# Patient Record
Sex: FEMALE | Race: WHITE | NOT HISPANIC OR LATINO | Employment: OTHER | URBAN - METROPOLITAN AREA
[De-identification: names, ages, dates, MRNs, and addresses within clinical notes are randomized per-mention and may not be internally consistent; named-entity substitution may affect disease eponyms.]

---

## 2020-08-16 ENCOUNTER — HOSPITAL ENCOUNTER (INPATIENT)
Facility: HOSPITAL | Age: 75
LOS: 4 days | Discharge: HOME HEALTH | End: 2020-08-20
Attending: INTERNAL MEDICINE | Admitting: INTERNAL MEDICINE

## 2020-08-16 ENCOUNTER — APPOINTMENT (OUTPATIENT)
Dept: CT IMAGING | Facility: HOSPITAL | Age: 75
End: 2020-08-16

## 2020-08-16 ENCOUNTER — APPOINTMENT (OUTPATIENT)
Dept: GENERAL RADIOLOGY | Facility: HOSPITAL | Age: 75
End: 2020-08-16

## 2020-08-16 DIAGNOSIS — R47.1 DYSARTHRIA: Primary | ICD-10-CM

## 2020-08-16 DIAGNOSIS — R13.12 OROPHARYNGEAL DYSPHAGIA: ICD-10-CM

## 2020-08-16 DIAGNOSIS — R41.841 COGNITIVE COMMUNICATION DEFICIT: ICD-10-CM

## 2020-08-16 DIAGNOSIS — I63.9 CEREBROVASCULAR ACCIDENT (CVA), UNSPECIFIED MECHANISM (HCC): ICD-10-CM

## 2020-08-16 LAB
ALBUMIN SERPL-MCNC: 3.8 G/DL (ref 3.5–5.2)
ALBUMIN/GLOB SERPL: 1.3 G/DL
ALP SERPL-CCNC: 236 U/L (ref 39–117)
ALT SERPL W P-5'-P-CCNC: 7 U/L (ref 1–33)
ANION GAP SERPL CALCULATED.3IONS-SCNC: 13 MMOL/L (ref 5–15)
AST SERPL-CCNC: 14 U/L (ref 1–32)
BASOPHILS # BLD AUTO: 0.06 10*3/MM3 (ref 0–0.2)
BASOPHILS NFR BLD AUTO: 0.5 % (ref 0–1.5)
BILIRUB SERPL-MCNC: 0.4 MG/DL (ref 0–1.2)
BUN SERPL-MCNC: 18 MG/DL (ref 8–23)
BUN/CREAT SERPL: 31 (ref 7–25)
CALCIUM SPEC-SCNC: 9.8 MG/DL (ref 8.6–10.5)
CHLORIDE SERPL-SCNC: 104 MMOL/L (ref 98–107)
CO2 SERPL-SCNC: 21 MMOL/L (ref 22–29)
CREAT SERPL-MCNC: 0.58 MG/DL (ref 0.57–1)
DEPRECATED RDW RBC AUTO: 60.2 FL (ref 37–54)
EOSINOPHIL # BLD AUTO: 0.04 10*3/MM3 (ref 0–0.4)
EOSINOPHIL NFR BLD AUTO: 0.3 % (ref 0.3–6.2)
ERYTHROCYTE [DISTWIDTH] IN BLOOD BY AUTOMATED COUNT: 17 % (ref 12.3–15.4)
GFR SERPL CREATININE-BSD FRML MDRD: 101 ML/MIN/1.73
GLOBULIN UR ELPH-MCNC: 2.9 GM/DL
GLUCOSE BLDC GLUCOMTR-MCNC: 114 MG/DL (ref 70–130)
GLUCOSE SERPL-MCNC: 106 MG/DL (ref 65–99)
HCT VFR BLD AUTO: 36.9 % (ref 34–46.6)
HGB BLD-MCNC: 11.8 G/DL (ref 12–15.9)
IMM GRANULOCYTES # BLD AUTO: 0.06 10*3/MM3 (ref 0–0.05)
IMM GRANULOCYTES NFR BLD AUTO: 0.5 % (ref 0–0.5)
INR PPP: 0.99 (ref 0.85–1.16)
LYMPHOCYTES # BLD AUTO: 1.54 10*3/MM3 (ref 0.7–3.1)
LYMPHOCYTES NFR BLD AUTO: 11.7 % (ref 19.6–45.3)
MAGNESIUM SERPL-MCNC: 1.9 MG/DL (ref 1.6–2.4)
MCH RBC QN AUTO: 31 PG (ref 26.6–33)
MCHC RBC AUTO-ENTMCNC: 32 G/DL (ref 31.5–35.7)
MCV RBC AUTO: 96.9 FL (ref 79–97)
MONOCYTES # BLD AUTO: 1.11 10*3/MM3 (ref 0.1–0.9)
MONOCYTES NFR BLD AUTO: 8.5 % (ref 5–12)
NEUTROPHILS NFR BLD AUTO: 10.31 10*3/MM3 (ref 1.7–7)
NEUTROPHILS NFR BLD AUTO: 78.5 % (ref 42.7–76)
NRBC BLD AUTO-RTO: 0 /100 WBC (ref 0–0.2)
NT-PROBNP SERPL-MCNC: 2005 PG/ML (ref 0–1800)
PLATELET # BLD AUTO: 459 10*3/MM3 (ref 140–450)
PMV BLD AUTO: 8.9 FL (ref 6–12)
POTASSIUM SERPL-SCNC: 3.8 MMOL/L (ref 3.5–5.2)
PROCALCITONIN SERPL-MCNC: 3.24 NG/ML (ref 0–0.25)
PROT SERPL-MCNC: 6.7 G/DL (ref 6–8.5)
PROTHROMBIN TIME: 12.8 SECONDS (ref 11.5–14)
RBC # BLD AUTO: 3.81 10*6/MM3 (ref 3.77–5.28)
SODIUM SERPL-SCNC: 138 MMOL/L (ref 136–145)
TROPONIN T SERPL-MCNC: <0.01 NG/ML (ref 0–0.03)
WBC # BLD AUTO: 13.12 10*3/MM3 (ref 3.4–10.8)

## 2020-08-16 PROCEDURE — 80053 COMPREHEN METABOLIC PANEL: CPT | Performed by: INTERNAL MEDICINE

## 2020-08-16 PROCEDURE — 84484 ASSAY OF TROPONIN QUANT: CPT | Performed by: INTERNAL MEDICINE

## 2020-08-16 PROCEDURE — 93005 ELECTROCARDIOGRAM TRACING: CPT | Performed by: INTERNAL MEDICINE

## 2020-08-16 PROCEDURE — 70498 CT ANGIOGRAPHY NECK: CPT

## 2020-08-16 PROCEDURE — 83880 ASSAY OF NATRIURETIC PEPTIDE: CPT | Performed by: INTERNAL MEDICINE

## 2020-08-16 PROCEDURE — 85652 RBC SED RATE AUTOMATED: CPT | Performed by: INTERNAL MEDICINE

## 2020-08-16 PROCEDURE — 0042T HC CT CEREBRAL PERFUSION W/WO CONTRAST: CPT

## 2020-08-16 PROCEDURE — 84145 PROCALCITONIN (PCT): CPT | Performed by: INTERNAL MEDICINE

## 2020-08-16 PROCEDURE — 85025 COMPLETE CBC W/AUTO DIFF WBC: CPT | Performed by: INTERNAL MEDICINE

## 2020-08-16 PROCEDURE — 99222 1ST HOSP IP/OBS MODERATE 55: CPT | Performed by: NURSE PRACTITIONER

## 2020-08-16 PROCEDURE — 25010000002 ONDANSETRON PER 1 MG: Performed by: INTERNAL MEDICINE

## 2020-08-16 PROCEDURE — 99223 1ST HOSP IP/OBS HIGH 75: CPT | Performed by: INTERNAL MEDICINE

## 2020-08-16 PROCEDURE — 71045 X-RAY EXAM CHEST 1 VIEW: CPT

## 2020-08-16 PROCEDURE — 70496 CT ANGIOGRAPHY HEAD: CPT

## 2020-08-16 PROCEDURE — 0 IOPAMIDOL PER 1 ML: Performed by: INTERNAL MEDICINE

## 2020-08-16 PROCEDURE — 83735 ASSAY OF MAGNESIUM: CPT | Performed by: INTERNAL MEDICINE

## 2020-08-16 PROCEDURE — 82962 GLUCOSE BLOOD TEST: CPT

## 2020-08-16 PROCEDURE — 93010 ELECTROCARDIOGRAM REPORT: CPT | Performed by: INTERNAL MEDICINE

## 2020-08-16 PROCEDURE — 85610 PROTHROMBIN TIME: CPT | Performed by: INTERNAL MEDICINE

## 2020-08-16 RX ORDER — ASPIRIN 81 MG/1
81 TABLET, CHEWABLE ORAL DAILY
Status: DISCONTINUED | OUTPATIENT
Start: 2020-08-17 | End: 2020-08-20 | Stop reason: HOSPADM

## 2020-08-16 RX ORDER — ACETAMINOPHEN 325 MG/1
650 TABLET ORAL EVERY 4 HOURS PRN
Status: DISCONTINUED | OUTPATIENT
Start: 2020-08-16 | End: 2020-08-20 | Stop reason: HOSPADM

## 2020-08-16 RX ORDER — MORPHINE SULFATE 2 MG/ML
0.5 INJECTION, SOLUTION INTRAMUSCULAR; INTRAVENOUS ONCE
Status: COMPLETED | OUTPATIENT
Start: 2020-08-17 | End: 2020-08-16

## 2020-08-16 RX ORDER — HYDRALAZINE HYDROCHLORIDE 25 MG/1
25 TABLET, FILM COATED ORAL 3 TIMES DAILY
COMMUNITY

## 2020-08-16 RX ORDER — ONDANSETRON 4 MG/1
4 TABLET, FILM COATED ORAL EVERY 6 HOURS PRN
Status: DISCONTINUED | OUTPATIENT
Start: 2020-08-16 | End: 2020-08-20 | Stop reason: HOSPADM

## 2020-08-16 RX ORDER — CLOPIDOGREL BISULFATE 75 MG/1
75 TABLET ORAL DAILY
Status: DISCONTINUED | OUTPATIENT
Start: 2020-08-16 | End: 2020-08-20 | Stop reason: HOSPADM

## 2020-08-16 RX ORDER — ERGOCALCIFEROL 1.25 MG/1
50000 CAPSULE ORAL WEEKLY
COMMUNITY

## 2020-08-16 RX ORDER — SODIUM CHLORIDE 0.9 % (FLUSH) 0.9 %
10 SYRINGE (ML) INJECTION EVERY 12 HOURS SCHEDULED
Status: DISCONTINUED | OUTPATIENT
Start: 2020-08-16 | End: 2020-08-18

## 2020-08-16 RX ORDER — LEVOTHYROXINE SODIUM 0.1 MG/1
100 TABLET ORAL DAILY
COMMUNITY

## 2020-08-16 RX ORDER — ATORVASTATIN CALCIUM 40 MG/1
80 TABLET, FILM COATED ORAL NIGHTLY
Status: DISCONTINUED | OUTPATIENT
Start: 2020-08-16 | End: 2020-08-20 | Stop reason: HOSPADM

## 2020-08-16 RX ORDER — ACETAMINOPHEN 160 MG/5ML
650 SOLUTION ORAL EVERY 4 HOURS PRN
Status: DISCONTINUED | OUTPATIENT
Start: 2020-08-16 | End: 2020-08-20 | Stop reason: HOSPADM

## 2020-08-16 RX ORDER — CITALOPRAM 10 MG/1
10 TABLET ORAL NIGHTLY
COMMUNITY

## 2020-08-16 RX ORDER — HYDRALAZINE HYDROCHLORIDE 20 MG/ML
5 INJECTION INTRAMUSCULAR; INTRAVENOUS EVERY 6 HOURS PRN
Status: DISCONTINUED | OUTPATIENT
Start: 2020-08-16 | End: 2020-08-16

## 2020-08-16 RX ORDER — METOPROLOL TARTRATE 5 MG/5ML
2.5 INJECTION INTRAVENOUS EVERY 6 HOURS PRN
Status: DISCONTINUED | OUTPATIENT
Start: 2020-08-16 | End: 2020-08-20 | Stop reason: HOSPADM

## 2020-08-16 RX ORDER — DOCUSATE SODIUM 100 MG/1
100 CAPSULE, LIQUID FILLED ORAL 2 TIMES DAILY PRN
Status: DISCONTINUED | OUTPATIENT
Start: 2020-08-16 | End: 2020-08-19

## 2020-08-16 RX ORDER — ONDANSETRON 2 MG/ML
4 INJECTION INTRAMUSCULAR; INTRAVENOUS EVERY 6 HOURS PRN
Status: DISCONTINUED | OUTPATIENT
Start: 2020-08-16 | End: 2020-08-20 | Stop reason: HOSPADM

## 2020-08-16 RX ORDER — ACETAMINOPHEN 650 MG/1
650 SUPPOSITORY RECTAL EVERY 4 HOURS PRN
Status: DISCONTINUED | OUTPATIENT
Start: 2020-08-16 | End: 2020-08-20 | Stop reason: HOSPADM

## 2020-08-16 RX ORDER — CLOPIDOGREL BISULFATE 75 MG/1
75 TABLET ORAL DAILY
COMMUNITY

## 2020-08-16 RX ORDER — SODIUM CHLORIDE 0.9 % (FLUSH) 0.9 %
10 SYRINGE (ML) INJECTION AS NEEDED
Status: DISCONTINUED | OUTPATIENT
Start: 2020-08-16 | End: 2020-08-20 | Stop reason: HOSPADM

## 2020-08-16 RX ORDER — PANTOPRAZOLE SODIUM 40 MG/1
40 TABLET, DELAYED RELEASE ORAL 2 TIMES DAILY
COMMUNITY

## 2020-08-16 RX ORDER — PANTOPRAZOLE SODIUM 40 MG/10ML
40 INJECTION, POWDER, LYOPHILIZED, FOR SOLUTION INTRAVENOUS
Status: DISCONTINUED | OUTPATIENT
Start: 2020-08-17 | End: 2020-08-20 | Stop reason: HOSPADM

## 2020-08-16 RX ORDER — SIMVASTATIN 20 MG
20 TABLET ORAL NIGHTLY
COMMUNITY
End: 2020-08-20 | Stop reason: HOSPADM

## 2020-08-16 RX ORDER — ASPIRIN 300 MG/1
300 SUPPOSITORY RECTAL DAILY
Status: DISCONTINUED | OUTPATIENT
Start: 2020-08-17 | End: 2020-08-20 | Stop reason: HOSPADM

## 2020-08-16 RX ORDER — PROMETHAZINE HYDROCHLORIDE 12.5 MG/1
12.5 TABLET ORAL EVERY 6 HOURS PRN
COMMUNITY

## 2020-08-16 RX ORDER — MONTELUKAST SODIUM 10 MG/1
10 TABLET ORAL NIGHTLY
COMMUNITY

## 2020-08-16 RX ORDER — SODIUM CHLORIDE 0.9 % (FLUSH) 0.9 %
10 SYRINGE (ML) INJECTION EVERY 12 HOURS SCHEDULED
Status: DISCONTINUED | OUTPATIENT
Start: 2020-08-16 | End: 2020-08-20 | Stop reason: HOSPADM

## 2020-08-16 RX ADMIN — SODIUM CHLORIDE, PRESERVATIVE FREE 10 ML: 5 INJECTION INTRAVENOUS at 23:45

## 2020-08-16 RX ADMIN — ONDANSETRON 4 MG: 2 INJECTION INTRAMUSCULAR; INTRAVENOUS at 23:45

## 2020-08-16 RX ADMIN — MORPHINE SULFATE 0.5 MG: 2 INJECTION, SOLUTION INTRAMUSCULAR; INTRAVENOUS at 23:37

## 2020-08-16 RX ADMIN — SODIUM CHLORIDE, PRESERVATIVE FREE 10 ML: 5 INJECTION INTRAVENOUS at 23:36

## 2020-08-16 RX ADMIN — ACETAMINOPHEN 650 MG: 650 SUPPOSITORY RECTAL at 21:50

## 2020-08-16 RX ADMIN — IOPAMIDOL 115 ML: 755 INJECTION, SOLUTION INTRAVENOUS at 21:19

## 2020-08-16 NOTE — NURSING NOTE
ACC REVIEW REPORT: [facility]        PATIENT NAME: Morelia Madden    PATIENT ID: [id number]      COVID-19 ACC SCREENING       DOES THE PATIENT HAVE A FEVER GREATER THAN OR EQUAL .4: no    IS THE PATIENT EXPERIENCING SHORTNESS OF BREATH: no    DOES THE PATIENT HAVE A COUGH: no    DOES THE PATIENT HAVE ANY OF THE FOLLOWING RISK FACTORS:    EXPOSURE TO SUSPECTED OR KNOWN COVID-19: unknown    RECENT TRAVEL HISTORY TO ENDEMIC AREA (DOMESTIC/LOCAL): unknown    IS THE PATIENT A HEALTHCARE WORKER: unknown    HAS THE PATIENT BEEN TESTED FOR COVID-19: unknown    DATE TESTED:     LAB TESTING SENT TO:       BED: S 502    BED TYPE: 5F    BED GIVEN TO: Amina in the ED    TIME BED GIVEN: 1800    YOB: 1945    AGE: 75    GENDER: F    PREVIOUS ADMIT TO [facility]: no    PREVIOUS ADMISSION DATE:     PATIENT CLASS:     TODAY'S DATE: 8/16/2020    TRANSFER DATE: 8-16-20    ETA: after 1930    TRANSFERRING FACILITY: [facility]    TRANSFERRING FACILITY PHONE # : [phone]    TRANSFERRING MD: Mindi Landaverde     ACCEPTING PROVIDER: JANICE Sorto)    NEUROLOGY PHYSICIAN: Aleah    DATE/TIME REQUEST RECEIVED: 8-16-20@1750    [facility] RN: Mabel Sanchez    REPORT FROM: Amina in the ED    TIME REPORT TAKEN: 1755    DIAGNOSIS: CVA    REASON FOR TRANSFER TO [facility]: higher level of care    TRANSPORTATION: EMS    CLINICAL REASON FOR TRANSFER TO [facility]: 75 y.o. Presented to the local ED with her son bringing her  Pt lives with her mentally disabled daughter - symptoms started last evening - LKW 2100 8/15/20 with dysarthria and expressive aphasia and left facial droop  Pt has a hx of CVA vs TIA with no neurological deficit about one year ago  NIHSS 8 per MD, 6 per RN      CLINICAL INFORMATION    HEIGHT:     WEIGHT: estimated 80#    ALLERGIES: flomax, valsartan    MOODY: no    INFECTIOUS DISEASE: unknown    ISOLATION:     LAST VITAL SIGNS:  TIME: 1800  TEMP: 97.7 Ax on arrival  PULSE: 115  B/P: 115/56  RESP: 24    LAB  INFORMATION: WBC 12.6, H&H: 12/37, plt 460, lactate 1.2, fsbs 103  Other labs are pending    CULTURE INFORMATION:     MEDS/IV FLUIDS: #20 left forearm - no meds given      CARDIAC SYSTEM:    CHEST PAIN: yes - slight - pt has RA - unknown if this is related - EKG pending    RHYTHM: ST    Is patient taking or has patient been given any drugs that could increase bleeding? yes  (Plavix, Brilinta, Effient, Eliquis, Xarelto, Warfarin, Integrilin, Angiomax)    DRUG: plavis     DOSE/FREQUENCY:     CARDIAC ENZYMES: troponin level pending    CARDIAC NOTES: + hx HTN, HLD      RESPIRATORY SYSTEM:    LUNG SOUNDS:  unknown    OXYGEN: no    O2 SAT: 96% on RA    RESPIRATORY STATUS: + hx COPD, no soa      CNS/MUSCULOSKELETAL      JORJE COMA SCALE:    LAST KNOWN WELL: before 8/16/20@2100- pt woke up from a nap with sx          NIHSS    Survey Item  0: Means Alert  1: Drowsy or Answer Correctly  2: Incorrect, Forced, Can't Resist Gravity  3: Complete or No Effort  4: No Movement  NT: Not Testable Acceptable As Noted Above      1A: Level of Consciousness: 0    1B: LOC Questions (month, age) : 2    1C: LOC Commands (open/close eyes, make a fist & let go): 0    2:  Best Gaze (eyes open-pt follows examiner's fingers or face): 0    3:  Visual (introduce visual stimulus/threat to pt's visual field quad. Cover 1 eye and hold up fingers in all 4 quadrants) : 0    4.  Facial Palsy (show teeth, raise eyebrows and squeeze eyes tightly shut): 1 (left)    5A: Motor Arm-Left (elevate extremity to 90 degrees and score drift/movement.  Count to 10 aloud and use fingers for visual cue): 0    5B:  Motor Arm-Right (elevate extremity to 90 degrees and score drift/movement.  Count to 10 aloud and use fingers for visual cue): 0    6A:  Motor Leg-Left (elevate extremity to 30 degrees and score drift/movement.  Count to 5 out loud and use fingers for visual cue): 0    6B:  Motor Leg-Right (elevate extremity to 30 degrees and score drift/movement.  Count to  5 out loud and use fingers for visual cue): 0    7:  Limb Ataxia- finger to nose, heel down shin: 0    8:  Sensory- pin prick to face, arms, trunk, and legs. Compare sharpness side to side: 0    9:  Best Language- name, items, describe picture, and read sentences.  Do not forget glasses if they normally wear them: 1    10: Dysarthria- elevate speech clarity by pt reading or repeating words on a list: 2    11: Extinction and Inattention- Use information from prior testing or double simultaneous stimuli testing to identify neglect. Face, arms, legs and visual field: 0    Total NIHSS Score: 6  Date: 8-16-20  Time of NIHSS Assessment: 1711      CAT SCAN RESULTS: no acute findings    CNS/MUSCULOSKELETAL NOTES: pt cannot swallow - she does have a gag reflex; she can verbalize a few words - hard to understand - no sentences formed; she OKEEFE equally; she is fidgity      GI//GY      ABDOMINAL PAIN: none reported    VOMITING: none in the ED    DIARRHEA: none int he ED    NAUSEA: unknown    GI//GY NOTES: has been NPO    PAST MEDICAL HISTORY: COPD, thyroid d/o, HTN, HLD, TIA vs. CVA    OTHER SYMPTOM NOTES: pt sleeps during the day and stays up at night        Gale Sanchez RN  8/16/2020  18:13

## 2020-08-17 ENCOUNTER — APPOINTMENT (OUTPATIENT)
Dept: CT IMAGING | Facility: HOSPITAL | Age: 75
End: 2020-08-17

## 2020-08-17 ENCOUNTER — APPOINTMENT (OUTPATIENT)
Dept: GENERAL RADIOLOGY | Facility: HOSPITAL | Age: 75
End: 2020-08-17

## 2020-08-17 ENCOUNTER — APPOINTMENT (OUTPATIENT)
Dept: MRI IMAGING | Facility: HOSPITAL | Age: 75
End: 2020-08-17

## 2020-08-17 ENCOUNTER — APPOINTMENT (OUTPATIENT)
Dept: CARDIOLOGY | Facility: HOSPITAL | Age: 75
End: 2020-08-17

## 2020-08-17 PROBLEM — J44.9 COPD (CHRONIC OBSTRUCTIVE PULMONARY DISEASE) (HCC): Status: ACTIVE | Noted: 2020-08-17

## 2020-08-17 PROBLEM — K21.9 GERD WITHOUT ESOPHAGITIS: Status: ACTIVE | Noted: 2020-08-17

## 2020-08-17 PROBLEM — I10 ESSENTIAL HYPERTENSION: Status: ACTIVE | Noted: 2020-08-17

## 2020-08-17 PROBLEM — E03.9 HYPOTHYROIDISM (ACQUIRED): Status: ACTIVE | Noted: 2020-08-17

## 2020-08-17 PROBLEM — E78.5 HYPERLIPEMIA: Status: ACTIVE | Noted: 2020-08-17

## 2020-08-17 LAB
ABO GROUP BLD: NORMAL
ABO GROUP BLD: NORMAL
B PARAPERT DNA SPEC QL NAA+PROBE: NOT DETECTED
B PERT DNA SPEC QL NAA+PROBE: NOT DETECTED
BH CV ECHO MEAS - AO ROOT AREA (BSA CORRECTED): 1.4
BH CV ECHO MEAS - AO ROOT AREA: 2.5 CM^2
BH CV ECHO MEAS - AO ROOT DIAM: 1.8 CM
BH CV ECHO MEAS - BSA(HAYCOCK): 1.2 M^2
BH CV ECHO MEAS - BSA: 1.3 M^2
BH CV ECHO MEAS - BZI_BMI: 16 KILOGRAMS/M^2
BH CV ECHO MEAS - BZI_METRIC_HEIGHT: 152.4 CM
BH CV ECHO MEAS - BZI_METRIC_WEIGHT: 37.2 KG
BH CV ECHO MEAS - EDV(CUBED): 75.8 ML
BH CV ECHO MEAS - EDV(MOD-SP2): 44 ML
BH CV ECHO MEAS - EDV(MOD-SP4): 60 ML
BH CV ECHO MEAS - EDV(TEICH): 80 ML
BH CV ECHO MEAS - EF(CUBED): 80.3 %
BH CV ECHO MEAS - EF(MOD-SP2): 61.4 %
BH CV ECHO MEAS - EF(MOD-SP4): 65 %
BH CV ECHO MEAS - EF(TEICH): 73.1 %
BH CV ECHO MEAS - ESV(CUBED): 14.9 ML
BH CV ECHO MEAS - ESV(MOD-SP2): 17 ML
BH CV ECHO MEAS - ESV(MOD-SP4): 21 ML
BH CV ECHO MEAS - ESV(TEICH): 21.5 ML
BH CV ECHO MEAS - FS: 41.8 %
BH CV ECHO MEAS - IVS/LVPW: 1
BH CV ECHO MEAS - IVSD: 0.94 CM
BH CV ECHO MEAS - LA DIMENSION: 3.3 CM
BH CV ECHO MEAS - LA/AO: 1.9
BH CV ECHO MEAS - LAD MAJOR: 4.2 CM
BH CV ECHO MEAS - LV DIASTOLIC VOL/BSA (35-75): 47 ML/M^2
BH CV ECHO MEAS - LV IVRT: 0.13 SEC
BH CV ECHO MEAS - LV MASS(C)D: 125.1 GRAMS
BH CV ECHO MEAS - LV MASS(C)DI: 97.9 GRAMS/M^2
BH CV ECHO MEAS - LV MAX PG: 9.7 MMHG
BH CV ECHO MEAS - LV MEAN PG: 5.6 MMHG
BH CV ECHO MEAS - LV SYSTOLIC VOL/BSA (12-30): 16.4 ML/M^2
BH CV ECHO MEAS - LV V1 MAX: 155.8 CM/SEC
BH CV ECHO MEAS - LV V1 MEAN: 109.4 CM/SEC
BH CV ECHO MEAS - LV V1 VTI: 25 CM
BH CV ECHO MEAS - LVIDD: 4.2 CM
BH CV ECHO MEAS - LVIDS: 2.5 CM
BH CV ECHO MEAS - LVLD AP2: 5.6 CM
BH CV ECHO MEAS - LVLD AP4: 6.6 CM
BH CV ECHO MEAS - LVLS AP2: 4.3 CM
BH CV ECHO MEAS - LVLS AP4: 5.5 CM
BH CV ECHO MEAS - LVOT AREA (M): 2.3 CM^2
BH CV ECHO MEAS - LVOT AREA: 2.4 CM^2
BH CV ECHO MEAS - LVOT DIAM: 1.7 CM
BH CV ECHO MEAS - LVPWD: 0.91 CM
BH CV ECHO MEAS - MV A MAX VEL: 110.3 CM/SEC
BH CV ECHO MEAS - MV DEC SLOPE: 615 CM/SEC^2
BH CV ECHO MEAS - MV DEC TIME: 0.2 SEC
BH CV ECHO MEAS - MV E MAX VEL: 62.5 CM/SEC
BH CV ECHO MEAS - MV E/A: 0.57
BH CV ECHO MEAS - MV MAX PG: 7.1 MMHG
BH CV ECHO MEAS - MV MEAN PG: 3 MMHG
BH CV ECHO MEAS - MV P1/2T MAX VEL: 95 CM/SEC
BH CV ECHO MEAS - MV P1/2T: 45.3 MSEC
BH CV ECHO MEAS - MV V2 MAX: 133 CM/SEC
BH CV ECHO MEAS - MV V2 MEAN: 81.8 CM/SEC
BH CV ECHO MEAS - MV V2 VTI: 22.8 CM
BH CV ECHO MEAS - MVA P1/2T LCG: 2.3 CM^2
BH CV ECHO MEAS - MVA(P1/2T): 4.9 CM^2
BH CV ECHO MEAS - MVA(VTI): 2.6 CM^2
BH CV ECHO MEAS - PA ACC SLOPE: 1015 CM/SEC^2
BH CV ECHO MEAS - PA ACC TIME: 0.13 SEC
BH CV ECHO MEAS - PA PR(ACCEL): 22 MMHG
BH CV ECHO MEAS - PI END-D VEL: 141.2 CM/SEC
BH CV ECHO MEAS - RAP SYSTOLE: 3 MMHG
BH CV ECHO MEAS - RV MAX PG: 7.3 MMHG
BH CV ECHO MEAS - RV V1 MAX: 134.9 CM/SEC
BH CV ECHO MEAS - RVSP: 22 MMHG
BH CV ECHO MEAS - SI(CUBED): 47.6 ML/M^2
BH CV ECHO MEAS - SI(LVOT): 46.5 ML/M^2
BH CV ECHO MEAS - SI(MOD-SP2): 21.1 ML/M^2
BH CV ECHO MEAS - SI(MOD-SP4): 30.5 ML/M^2
BH CV ECHO MEAS - SI(TEICH): 45.8 ML/M^2
BH CV ECHO MEAS - SV(CUBED): 60.8 ML
BH CV ECHO MEAS - SV(LVOT): 59.4 ML
BH CV ECHO MEAS - SV(MOD-SP2): 27 ML
BH CV ECHO MEAS - SV(MOD-SP4): 39 ML
BH CV ECHO MEAS - SV(TEICH): 58.5 ML
BH CV ECHO MEAS - TR MAX PG: 19 MMHG
BH CV ECHO MEAS - TR MAX VEL: 214.5 CM/SEC
BH CV XLRA - RV BASE: 2.7 CM
BH CV XLRA - RV LENGTH: 4.5 CM
BH CV XLRA - RV MID: 2.3 CM
BILIRUB UR QL STRIP: NEGATIVE
BLD GP AB SCN SERPL QL: NEGATIVE
C PNEUM DNA NPH QL NAA+NON-PROBE: NOT DETECTED
CHOLEST SERPL-MCNC: 145 MG/DL (ref 0–200)
CLARITY UR: CLEAR
COLOR UR: YELLOW
CRP SERPL-MCNC: 7.14 MG/DL (ref 0–0.5)
ERYTHROCYTE [SEDIMENTATION RATE] IN BLOOD: 27 MM/HR (ref 0–30)
FLUAV H1 2009 PAND RNA NPH QL NAA+PROBE: NOT DETECTED
FLUAV H1 HA GENE NPH QL NAA+PROBE: NOT DETECTED
FLUAV H3 RNA NPH QL NAA+PROBE: NOT DETECTED
FLUAV SUBTYP SPEC NAA+PROBE: NOT DETECTED
FLUBV RNA ISLT QL NAA+PROBE: NOT DETECTED
GLUCOSE BLDC GLUCOMTR-MCNC: 103 MG/DL (ref 70–130)
GLUCOSE BLDC GLUCOMTR-MCNC: 137 MG/DL (ref 70–130)
GLUCOSE UR STRIP-MCNC: NEGATIVE MG/DL
HADV DNA SPEC NAA+PROBE: NOT DETECTED
HBA1C MFR BLD: 5.1 % (ref 4.8–5.6)
HCOV 229E RNA SPEC QL NAA+PROBE: NOT DETECTED
HCOV HKU1 RNA SPEC QL NAA+PROBE: NOT DETECTED
HCOV NL63 RNA SPEC QL NAA+PROBE: NOT DETECTED
HCOV OC43 RNA SPEC QL NAA+PROBE: NOT DETECTED
HDLC SERPL-MCNC: 46 MG/DL (ref 40–60)
HGB UR QL STRIP.AUTO: NEGATIVE
HMPV RNA NPH QL NAA+NON-PROBE: NOT DETECTED
HPIV1 RNA SPEC QL NAA+PROBE: NOT DETECTED
HPIV2 RNA SPEC QL NAA+PROBE: NOT DETECTED
HPIV3 RNA NPH QL NAA+PROBE: NOT DETECTED
HPIV4 P GENE NPH QL NAA+PROBE: NOT DETECTED
KETONES UR QL STRIP: ABNORMAL
LDLC SERPL CALC-MCNC: 79 MG/DL (ref 0–100)
LDLC/HDLC SERPL: 1.72 {RATIO}
LEFT ATRIUM VOLUME INDEX: 22.7 ML/M^2
LEFT ATRIUM VOLUME: 29 ML
LEUKOCYTE ESTERASE UR QL STRIP.AUTO: NEGATIVE
M PNEUMO IGG SER IA-ACNC: NOT DETECTED
MAXIMAL PREDICTED HEART RATE: 143 BPM
NITRITE UR QL STRIP: NEGATIVE
PA ADP PRP-ACNC: 82 PRU
PH UR STRIP.AUTO: 6.5 [PH] (ref 5–8)
PROT UR QL STRIP: NEGATIVE
RH BLD: POSITIVE
RH BLD: POSITIVE
RHINOVIRUS RNA SPEC NAA+PROBE: NOT DETECTED
RSV RNA NPH QL NAA+NON-PROBE: NOT DETECTED
SARS-COV-2 RNA PNL SPEC NAA+PROBE: NOT DETECTED
SP GR UR STRIP: 1.05 (ref 1–1.03)
STRESS TARGET HR: 122 BPM
T&S EXPIRATION DATE: NORMAL
TRIGL SERPL-MCNC: 100 MG/DL (ref 0–150)
UROBILINOGEN UR QL STRIP: ABNORMAL
VLDLC SERPL-MCNC: 20 MG/DL

## 2020-08-17 PROCEDURE — 74230 X-RAY XM SWLNG FUNCJ C+: CPT

## 2020-08-17 PROCEDURE — 80061 LIPID PANEL: CPT | Performed by: NURSE PRACTITIONER

## 2020-08-17 PROCEDURE — 81003 URINALYSIS AUTO W/O SCOPE: CPT | Performed by: INTERNAL MEDICINE

## 2020-08-17 PROCEDURE — 83036 HEMOGLOBIN GLYCOSYLATED A1C: CPT | Performed by: NURSE PRACTITIONER

## 2020-08-17 PROCEDURE — 99233 SBSQ HOSP IP/OBS HIGH 50: CPT | Performed by: STUDENT IN AN ORGANIZED HEALTH CARE EDUCATION/TRAINING PROGRAM

## 2020-08-17 PROCEDURE — 92611 MOTION FLUOROSCOPY/SWALLOW: CPT

## 2020-08-17 PROCEDURE — 0202U NFCT DS 22 TRGT SARS-COV-2: CPT | Performed by: INTERNAL MEDICINE

## 2020-08-17 PROCEDURE — 92610 EVALUATE SWALLOWING FUNCTION: CPT

## 2020-08-17 PROCEDURE — 86901 BLOOD TYPING SEROLOGIC RH(D): CPT

## 2020-08-17 PROCEDURE — 97161 PT EVAL LOW COMPLEX 20 MIN: CPT

## 2020-08-17 PROCEDURE — 85576 BLOOD PLATELET AGGREGATION: CPT | Performed by: STUDENT IN AN ORGANIZED HEALTH CARE EDUCATION/TRAINING PROGRAM

## 2020-08-17 PROCEDURE — 82962 GLUCOSE BLOOD TEST: CPT

## 2020-08-17 PROCEDURE — 25010000002 MORPHINE PER 10 MG: Performed by: INTERNAL MEDICINE

## 2020-08-17 PROCEDURE — 86900 BLOOD TYPING SEROLOGIC ABO: CPT

## 2020-08-17 PROCEDURE — 86850 RBC ANTIBODY SCREEN: CPT | Performed by: INTERNAL MEDICINE

## 2020-08-17 PROCEDURE — 86900 BLOOD TYPING SEROLOGIC ABO: CPT | Performed by: INTERNAL MEDICINE

## 2020-08-17 PROCEDURE — 97166 OT EVAL MOD COMPLEX 45 MIN: CPT

## 2020-08-17 PROCEDURE — 86140 C-REACTIVE PROTEIN: CPT | Performed by: INTERNAL MEDICINE

## 2020-08-17 PROCEDURE — 86901 BLOOD TYPING SEROLOGIC RH(D): CPT | Performed by: INTERNAL MEDICINE

## 2020-08-17 PROCEDURE — 93306 TTE W/DOPPLER COMPLETE: CPT

## 2020-08-17 PROCEDURE — 92523 SPEECH SOUND LANG COMPREHEN: CPT

## 2020-08-17 PROCEDURE — 72131 CT LUMBAR SPINE W/O DYE: CPT

## 2020-08-17 PROCEDURE — 25010000002 ONDANSETRON PER 1 MG: Performed by: INTERNAL MEDICINE

## 2020-08-17 PROCEDURE — 70551 MRI BRAIN STEM W/O DYE: CPT

## 2020-08-17 PROCEDURE — 93306 TTE W/DOPPLER COMPLETE: CPT | Performed by: INTERNAL MEDICINE

## 2020-08-17 PROCEDURE — 99232 SBSQ HOSP IP/OBS MODERATE 35: CPT | Performed by: INTERNAL MEDICINE

## 2020-08-17 RX ORDER — CITALOPRAM 20 MG/1
10 TABLET ORAL NIGHTLY
Status: DISCONTINUED | OUTPATIENT
Start: 2020-08-17 | End: 2020-08-20 | Stop reason: HOSPADM

## 2020-08-17 RX ORDER — LORAZEPAM 2 MG/ML
0.25 INJECTION INTRAMUSCULAR ONCE
Status: DISCONTINUED | OUTPATIENT
Start: 2020-08-17 | End: 2020-08-18

## 2020-08-17 RX ORDER — MORPHINE SULFATE 2 MG/ML
0.5 INJECTION, SOLUTION INTRAMUSCULAR; INTRAVENOUS ONCE
Status: COMPLETED | OUTPATIENT
Start: 2020-08-17 | End: 2020-08-17

## 2020-08-17 RX ORDER — LEVOTHYROXINE SODIUM 0.1 MG/1
100 TABLET ORAL
Status: DISCONTINUED | OUTPATIENT
Start: 2020-08-18 | End: 2020-08-20 | Stop reason: HOSPADM

## 2020-08-17 RX ADMIN — SODIUM CHLORIDE, PRESERVATIVE FREE 10 ML: 5 INJECTION INTRAVENOUS at 12:29

## 2020-08-17 RX ADMIN — BARIUM SULFATE 20 ML: 400 PASTE ORAL at 12:00

## 2020-08-17 RX ADMIN — CITALOPRAM HYDROBROMIDE 10 MG: 20 TABLET ORAL at 22:20

## 2020-08-17 RX ADMIN — PANTOPRAZOLE SODIUM 40 MG: 40 INJECTION, POWDER, FOR SOLUTION INTRAVENOUS at 06:08

## 2020-08-17 RX ADMIN — ACETAMINOPHEN 650 MG: 325 TABLET, FILM COATED ORAL at 12:28

## 2020-08-17 RX ADMIN — BARIUM SULFATE 50 ML: 400 SUSPENSION ORAL at 12:00

## 2020-08-17 RX ADMIN — ACETAMINOPHEN 650 MG: 325 TABLET, FILM COATED ORAL at 19:16

## 2020-08-17 RX ADMIN — BARIUM SULFATE 100 ML: 0.81 POWDER, FOR SUSPENSION ORAL at 12:00

## 2020-08-17 RX ADMIN — SODIUM CHLORIDE, PRESERVATIVE FREE 10 ML: 5 INJECTION INTRAVENOUS at 22:20

## 2020-08-17 RX ADMIN — BARIUM SULFATE 10 ML: 400 SUSPENSION ORAL at 12:00

## 2020-08-17 RX ADMIN — ONDANSETRON 4 MG: 2 INJECTION INTRAMUSCULAR; INTRAVENOUS at 06:10

## 2020-08-17 RX ADMIN — ASPIRIN 81 MG: 81 TABLET, CHEWABLE ORAL at 12:28

## 2020-08-17 RX ADMIN — MORPHINE SULFATE 0.5 MG: 2 INJECTION, SOLUTION INTRAMUSCULAR; INTRAVENOUS at 06:09

## 2020-08-17 RX ADMIN — CLOPIDOGREL BISULFATE 75 MG: 75 TABLET ORAL at 12:28

## 2020-08-17 RX ADMIN — ATORVASTATIN CALCIUM 80 MG: 40 TABLET, FILM COATED ORAL at 22:19

## 2020-08-17 NOTE — PLAN OF CARE
"Slept often.  Oriented x 2 when awake.  NIH \"7\" with facial asymmetry, BUE drift, aphasia, and dysarthria.  Respirations unlabored.  Monitor NSR.  PRN Tylenol once for back pain.  Diet upgraded to pureed/honey thick liquids but appetite poor.  Specialty bed placed.  Up to bedside commode 1 assist.  Updated daughter and daughter-in-law (POA) by phone multiple times.    "

## 2020-08-17 NOTE — THERAPY EVALUATION
Patient Name: Morelia Madden  : 1945    MRN: [id number]                              Today's Date: 2020       Admit Date: 2020    Visit Dx:     ICD-10-CM ICD-9-CM   1. Dysarthria R47.1 784.51   2. Oropharyngeal dysphagia R13.12 787.22     Patient Active Problem List   Diagnosis   • Stroke (cerebrum) (CMS/[facility])   • Essential hypertension   • GERD without esophagitis   • COPD (chronic obstructive pulmonary disease) (CMS/[facility])   • Hypothyroidism (acquired)   • Hyperlipemia     No past medical history on file.  No past surgical history on file.  General Information     Row Name 20 1344          PT Evaluation Time/Intention    Document Type  evaluation  -KG     Mode of Treatment  physical therapy  -KG     Row Name 20 1429 20 1344       General Information    Patient Profile Reviewed?  --  yes  -KG    Prior Level of Function  -- CM reports daughter assists with care at home  -KG  --    Existing Precautions/Restrictions  fall bedrest with bathroom exception  -KG  --    Barriers to Rehab  medically complex  -KG  --    Row Name 20 1429          Relationship/Environment    Lives With  child(moreno), adult;grandchild(moreno)  -KG     Row Name 20 1429          Resource/Environmental Concerns    Current Living Arrangements  home/apartment/condo  -KG     Row Name 20 1429          Home Main Entrance    Number of Stairs, Main Entrance  none  -KG     Row Name 20 1429          Stairs Within Home, Primary    Number of Stairs, Within Home, Primary  none  -KG     Row Name 20 1429          Cognitive Assessment/Intervention- PT/OT    Orientation Status (Cognition)  oriented x 3  -KG     Cognitive Assessment/Intervention Comment  expressive aphasia  -KG     Row Name 20 1429          Safety Issues, Functional Mobility    Safety Issues Affecting Function (Mobility)  impulsivity;safety precaution awareness  -KG     Impairments Affecting Function (Mobility)   balance;cognition;coordination;endurance/activity tolerance;pain;range of motion (ROM);strength  -KG       User Key  (r) = Recorded By, (t) = Taken By, (c) = Cosigned By    Initials Name Provider Type    ESSIE Jayne Hampton Physical Therapist        Mobility     Row Name 08/17/20 1433          Bed Mobility Assessment/Treatment    Bed Mobility Assessment/Treatment  supine-sit;sit-supine  -KG     Supine-Sit Fowler (Bed Mobility)  verbal cues;minimum assist (75% patient effort);2 person assist  -KG     Sit-Supine Fowler (Bed Mobility)  verbal cues;moderate assist (50% patient effort);2 person assist  -KG     Assistive Device (Bed Mobility)  bed rails;head of bed elevated  -KG     Row Name 08/17/20 1433          Sit-Stand Transfer    Sit-Stand Fowler (Transfers)  verbal cues;moderate assist (50% patient effort);2 person assist  -KG     Assistive Device (Sit-Stand Transfers)  walker, front-wheeled  -KG     Row Name 08/17/20 1433          Gait/Stairs Assessment/Training    Gait/Stairs Assessment/Training  gait/ambulation independence  -KG     Fowler Level (Gait)  moderate assist (50% patient effort);2 person assist  -KG     Assistive Device (Gait)  walker, front-wheeled  -KG     Distance in Feet (Gait)  14',14' to bathroom  -KG     Deviations/Abnormal Patterns (Gait)  gait speed decreased;festinating/shuffling  -KG     Bilateral Gait Deviations  forward flexed posture  -KG     Comment (Gait/Stairs)  Pt able to ambulate 14' to bathroom, then 14' back to bed with RW, mod-A x2.  Pt with ataxic and weak gait, needed significant assist controlling walker, LLE more difficult to advance than RLE.    -KG       User Key  (r) = Recorded By, (t) = Taken By, (c) = Cosigned By    Initials Name Provider Type    ESSIE Jayne Hampton Physical Therapist        Obj/Interventions     Row Name 08/17/20 1438          MMT (Manual Muscle Testing)    General MMT Comments  RLE 3-/5, LLE 3+/5  -KG     Row Name 08/17/20  1438          Static Sitting Balance    Level of [place] (Unsupported Sitting, Static Balance)  minimal assist, 75% patient effort  -KG     Sitting Position (Unsupported Sitting, Static Balance)  sitting on edge of bed  -KG     Time Able to Maintain Position (Unsupported Sitting, Static Balance)  45 to 60 seconds  -KG     Row Name 08/17/20 1438          Dynamic Standing Balance    Level of [place], Reaches Outside Midline (Standing, Dynamic Balance)  moderate assist, 50 to 74% patient effort;2 person assist  -KG     Time Able to Maintain Position, Reaches Outside Midline (Standing, Dynamic Balance)  more than 5 minutes  -KG     Assistive Device Utilized (Supported Standing, Dynamic Balance)  walker, rolling  -KG       User Key  (r) = Recorded By, (t) = Taken By, (c) = Cosigned By    Initials Name Provider Type    Jayne Brown Physical Therapist        Goals/Plan     Row Name 08/17/20 1443          Bed Mobility Goal 1 (PT)    Activity/Assistive Device (Bed Mobility Goal 1, PT)  sit to supine/supine to sit  -KG     [place] Level/Cues Needed (Bed Mobility Goal 1, PT)  minimum assist (75% or more patient effort)  -KG     Time Frame (Bed Mobility Goal 1, PT)  long term goal (LTG);10 days  -KG     Progress/Outcomes (Bed Mobility Goal 1, PT)  continuing progress toward goal  -KG     Row Name 08/17/20 1443          Transfer Goal 1 (PT)    Activity/Assistive Device (Transfer Goal 1, PT)  sit-to-stand/stand-to-sit;commode, bedside  -KG     [place] Level/Cues Needed (Transfer Goal 1, PT)  minimum assist (75% or more patient effort);1 person assist  -KG     Time Frame (Transfer Goal 1, PT)  long term goal (LTG);10 days  -KG     Progress/Outcome (Transfer Goal 1, PT)  continuing progress toward goal  -KG       User Key  (r) = Recorded By, (t) = Taken By, (c) = Cosigned By    Initials Name Provider Type    Jayne Brown Physical Therapist        Clinical Impression     Row Name 08/17/20 1449           Pain Assessment    Additional Documentation  Pain Scale: Numbers Pre/Post-Treatment (Group)  -KG     Row Name 08/17/20 1440          Pain Scale: Numbers Pre/Post-Treatment    Pain Scale: Numbers, Pretreatment  7/10  -KG     Pain Scale: Numbers, Post-Treatment  7/10  -KG     Pain Location - Orientation  lower  -KG     Pain Location  back  -KG     Pain Intervention(s)  Repositioned  -KG     Row Name 08/17/20 1440          Plan of Care Review    Plan of Care Reviewed With  patient  -KG     Progress  no change  -KG     Outcome Summary  PT eval complete: Pt presenting with expressive aphasia, low back pain, weakness (L > R), poor coordination.  Min-A supine to sit, Mod-A sit to supine, Mod-A x2 transfers/ ambulation.  Pt able to ambulate 14' to bathroom, then 14' back to bed with RW, mod-A x2.  Pt with ataxic and weak gait, needed significant assist controlling walker, LLE more difficult to advance than RLE.  Apppropriate for IPPT, recommend IPR at d/c if pt agrees.    -KG     Row Name 08/17/20 1440          Physical Therapy Clinical Impression    Criteria for Skilled Interventions Met (PT Clinical Impression)  treatment indicated  -KG     Rehab Potential (PT Clinical Summary)  fair, will monitor progress closely  -KG     Row Name 08/17/20 1440          Vital Signs    Pre Systolic BP Rehab  178  -KG     Pre Treatment Diastolic BP  81  -KG     Post Systolic BP Rehab  174  -KG     Post Treatment Diastolic BP  91  -KG     Pretreatment Heart Rate (beats/min)  114  -KG     Intratreatment Heart Rate (beats/min)  125  -KG     Posttreatment Heart Rate (beats/min)  97  -KG     Post SpO2 (%)  95  -KG     Giulia Name 08/17/20 1440          Positioning and Restraints    Pre-Treatment Position  in bed  -KG     Post Treatment Position  bed  -KG     In Bed  fowlers;call light within reach;encouraged to call for assist;exit alarm on  -KG       User Key  (r) = Recorded By, (t) = Taken By, (c) = Cosigned By    Initials Name Provider  Type    KG Jayne Hampton Physical Therapist        Outcome Measures     Row Name 08/17/20 1445          How much help from another person do you currently need...    Turning from your back to your side while in flat bed without using bedrails?  4  -KG     Moving from lying on back to sitting on the side of a flat bed without bedrails?  3  -KG     Moving to and from a bed to a chair (including a wheelchair)?  2  -KG     Standing up from a chair using your arms (e.g., wheelchair, bedside chair)?  2  -KG     Climbing 3-5 steps with a railing?  2  -KG     To walk in hospital room?  2  -KG     AM-PAC 6 Clicks Score (PT)  15  -KG     Row Name 08/17/20 1445          Modified Starke Scale    Modified Eitan Scale  4 - Moderately severe disability.  Unable to walk without assistance, and unable to attend to own bodily needs without assistance.  -KG     Row Name 08/17/20 1445          Functional Assessment    Outcome Measure Options  AM-PAC 6 Clicks Basic Mobility (PT)  -KG       User Key  (r) = Recorded By, (t) = Taken By, (c) = Cosigned By    Initials Name Provider Type    ESSIE Jayne Hampton Physical Therapist        Physical Therapy Education                 Title: PT OT SLP Therapies (In Progress)     Topic: Physical Therapy (Done)     Point: Mobility training (Done)     Description:   Instruct learner(s) on safety and technique for assisting patient out of bed, chair or wheelchair.  Instruct in the proper use of assistive devices, such as walker, crutches, cane or brace.              Patient Friendly Description:   It's important to get you on your feet again, but we need to do so in a way that is safe for you. Falling has serious consequences, and your personal safety is the most important thing of all.        When it's time to get out of bed, one of us or a family member will sit next to you on the bed to give you support.     If your doctor or nurse tells you to use a walker, crutches, a cane, or a brace, be  sure you use it every time you get out of bed, even if you think you don't need it.    Learning Progress Summary           Patient Acceptance, E,TB, VU,NR by KG at 8/17/2020 1445                   Point: Home exercise program (Done)     Description:   Instruct learner(s) on appropriate technique for monitoring, assisting and/or progressing patient with therapeutic exercises and activities.              Learning Progress Summary           Patient Acceptance, E,TB, VU,NR by KG at 8/17/2020 1445                   Point: Body mechanics (Done)     Description:   Instruct learner(s) on proper positioning and spine alignment for patient and/or caregiver during mobility tasks and/or exercises.              Learning Progress Summary           Patient Acceptance, E,TB, VU,NR by KG at 8/17/2020 1445                   Point: Precautions (Done)     Description:   Instruct learner(s) on prescribed precautions during mobility and gait tasks              Learning Progress Summary           Patient Acceptance, E,TB, VU,NR by KG at 8/17/2020 1445                               User Key     Initials Effective Dates Name Provider Type Discipline    KG 08/28/19 -  Jayne Hampton Physical Therapist PT              PT Recommendation and Plan     Plan of Care Reviewed With: patient  Progress: no change  Outcome Summary: PT eval complete: Pt presenting with expressive aphasia, low back pain, weakness (L > R), poor coordination.  Min-A supine to sit, Mod-A sit to supine, Mod-A x2 transfers/ ambulation.  Pt able to ambulate 14' to bathroom, then 14' back to bed with RW, mod-A x2.  Pt with ataxic and weak gait, needed significant assist controlling walker, LLE more difficult to advance than RLE.  Apppropriate for IPPT, recommend IPR at d/c if pt agrees.       Time Calculation:   PT Charges     Row Name 08/17/20 1446             Time Calculation    Start Time  1310  -KG      PT Received On  08/17/20  -KG      PT Goal Re-Cert Due Date   08/27/20  -ESSIE        User Key  (r) = Recorded By, (t) = Taken By, (c) = Cosigned By    Initials Name Provider Type    Jayne Brown Physical Therapist        Therapy Charges for Today     Code Description Service Date Service Provider Modifiers Qty    92908383000 HC PT EVAL LOW COMPLEXITY 4 8/17/2020 Jayne Hampton GP 1          PT G-Codes  Outcome Measure Options: AM-PAC 6 Clicks Basic Mobility (PT)  AM-PAC 6 Clicks Score (PT): 15  AM-PAC 6 Clicks Score (OT): 17  Modified Eitan Scale: 4 - Moderately severe disability.  Unable to walk without assistance, and unable to attend to own bodily needs without assistance.    Jayne Hampton  8/17/2020

## 2020-08-17 NOTE — H&P
[facility] Medicine Services  HISTORY AND PHYSICAL    Patient Name: Morelia Madden  : 1945  MRN: [id number]  Primary Care Physician: Samantha Oseguera MD  Date of admission: 2020      Subjective   Subjective     Chief Complaint:  Dysarthria.  (Limited history-secondary to dysarthria)    HPI:  Morelia Madden is a 75 y.o. female transferred from from [place] ED- for further work-up management of possible stroke.  Patient was brought in by son to ED- symptoms started approximately on 8/15/2020-at 9 PM- described as Right facial droop, dysarthria, expressive aphasia.  On initial ED evaluation NIH score was 8.  CT head without contrast- no acute intracranial abnormality, low attenuation in periventricular areas consistent with chronic small vessel ischemic disease.  On arrival here-patient had persistent left facial droop, along with drawing of the face towards the right side, significant dysarthria, had good reception, some word finding difficulties,  Good strength upper/lower extremity,  Did complain of lower extremity pain- which she states she always has it, along with lower back pain, denies any overt numbness or tingling in lower extremity, denies any bowel bladder incontinence.  Do not complain of chest pain, dyspnea, cough, headache, no direct exposure to COVID-19 known.  No complaint of fever or chills.  Did not complain of any abdominal pain nausea vomiting or dysuria.      Review of Systems   Complete ROS done, which is negative except as above and HPI.  Old records reviewed and summarized in PM hx      Personal History      past medical history on file.    TIA-approximately a year back without any residual deficit/?  CAD- Plavix 75 mg p.o. daily    Hypertension-hydralazine 25 mg p.o. every 8, Norvasc 10 mg p.o. daily, metoprolol succinate 100 mg p.o. Daily    GERD/GI bleed approximately 3 months back-Protonix 40 mg p.o.  Daily    Hyperlipidemia    COPD    Hypothyroid- Synthroid 100 mcg p.o. daily         past surgical history on file.  -Secondary to stroke symptoms patient cannot provide history.  -Has a surgical scar in lower midline back.    Family History:  -Patient cannot provide detailed history secondary to stroke, dysarthria and aphasia.      Social History:    -Could not provide detailed history.      Medications:  Available home medication information reviewed.  Medications Prior to Admission   Medication Sig Dispense Refill Last Dose   • citalopram (CeleXA) 10 MG tablet Take 10 mg by mouth Every Night.      • clopidogrel (PLAVIX) 75 MG tablet Take 75 mg by mouth Daily.      • hydrALAZINE (APRESOLINE) 25 MG tablet Take 25 mg by mouth 3 (Three) Times a Day.      • levothyroxine (SYNTHROID, LEVOTHROID) 100 MCG tablet Take 100 mcg by mouth Daily.      • montelukast (SINGULAIR) 10 MG tablet Take 10 mg by mouth Every Night.      • pantoprazole (PROTONIX) 40 MG EC tablet Take 40 mg by mouth 2 (two) times a day.      • promethazine (PHENERGAN) 12.5 MG tablet Take 12.5 mg by mouth Every 6 (Six) Hours As Needed for Nausea or Vomiting.      • simvastatin (ZOCOR) 20 MG tablet Take 20 mg by mouth Every Night.      • vitamin D (ERGOCALCIFEROL) 1.25 MG (35684 UT) capsule capsule Take 50,000 Units by mouth 1 (One) Time Per Week.          No Known Allergies    Objective   Objective     Vital Signs:   Temp:  [98 °F (36.7 °C)] 98 °F (36.7 °C)  Heart Rate:  [109-110] 109  Resp:  [16] 16  BP: (191-204)/(101-103) 204/103   Total (NIH Stroke Scale): 9    Physical Exam     GENERAL-malnourished, in distress secondary to lower extremity pain.  RS-good effort, decreased air entry at the bases.  CVS- s1s2 Regular, no murmur.  ABD- soft, non tender, not distended, no organomegaly.  EXT- no edema.  NEURO- AAO person, time, power 5/5 in all ext, no gross sensory deficit,Right facial droop, tongue midline, pupils reactive to light, speech dysarthric,  finger to nose test, ataxic on R.upper ext,numbness on R.face.  EYES- Conjunctivae are normal. Pupils are equal, round, and reactive to light. No scleral icterus.  EOM- patient could not participate consistently.  ENT- no external ear nose lesions, mucosa moist.  NECK- No JVD present. No tracheal deviation present. No thyromegaly present,No cervical lymphadenopathy.  JOINTS/MSK- no deformity, no swelling.  SKIN- no rash , warm to touch.  PSYCHIATRIC-anxious.  Results Reviewed:  I have personally reviewed current lab and radiology data.    Results from last 7 days   Lab Units 08/16/20 2131 08/16/20 2130   WBC 10*3/mm3 13.12*  --    HEMOGLOBIN g/dL 11.8*  --    HEMATOCRIT % 36.9  --    PLATELETS 10*3/mm3 459*  --    INR   --  0.99     Results from last 7 days   Lab Units 08/16/20 2131   SODIUM mmol/L 138   POTASSIUM mmol/L 3.8   CHLORIDE mmol/L 104   CO2 mmol/L 21.0*   BUN mg/dL 18   CREATININE mg/dL 0.58   GLUCOSE mg/dL 106*   CALCIUM mg/dL 9.8   ALT (SGPT) U/L 7   AST (SGOT) U/L 14   TROPONIN T ng/mL <0.010   PROBNP pg/mL 2,005.0*   PROCALCITONIN ng/mL 3.24*     Estimated Creatinine Clearance: 34.9 mL/min (by C-G formula based on SCr of 0.58 mg/dL).  Brief Urine Lab Results     None        Imaging Results (Last 24 Hours)     Procedure Component Value Units Date/Time    CT Angiogram Head [[id number]] Collected:  08/16/20 2209     Updated:  08/16/20 2211    Narrative:       INDICATION:   Ectasia. Last seen well around 2100 last night    TECHNIQUE:   CT angiogram of the head and neck with contrast. 3-D reformatted images were acquired. Evaluation for a significant carotid arterial stenosis is based on the NASCET criteria. Radiation dose reduction techniques included automated exposure control or  exposure modulation based on body size. Radiation audit for number of CT and nuclear cardiology exams performed in the last year:  1. Study performed during the intravenous administration of nonionic contrast media. The  doses recorded in the patient's  chart. This also precontrast imaging.    COMPARISON:   CT perfusion study earlier today.    FINDINGS:   CTA neck:  There are vascular calcifications at the aortic arch. Imaging begins above the great vessel origins. There are vascular calcifications at least involving the great vessel origins. I cannot accurately estimate the degree of stenosis. There is  also vascular calcification involving the origin of the right subclavian artery.    The right carotid system shows mild narrowing at the origin of the right common carotid artery. There is partially calcified plaque at the right carotid bifurcation. By NASCET criteria there is about 50% diameter stenosis of the proximal right internal  carotid artery. There is calcified plaque at the right carotid siphon with likely mild stenosis.    There is likely at least mild stenosis proximal left common carotid artery. There are postoperative changes at the left carotid bifurcation consistent with previous endarterectomy. The distal left common carotid artery is capacious. There is mild  narrowing at the origin of the left external carotid artery. There is an irregular appearance to the proximal left internal carotid artery at the distal postoperative bed with mild, less than 10% diameter stenosis by NASCET criteria. There is calcified  plaque at the left carotid siphon with likely mild stenosis.    There is calcified plaque at the origin of the left vertebral artery and involving the proximal right vertebral artery. Resultant narrowing is mild to moderate bilaterally. The vessels are codominant and both supply the basilar.    CTA head:  There is no intracranial vascular cut off. If there is an anterior communicating artery present it is tiny. There our likely tiny posterior communicating arteries bilaterally. There are tiny, 1 to 2 mm infundibuli at the origin of the  bilateral posterior communicators. Dural venous sinuses are patent. There  is mild irregularity of the distal left posterior cerebral artery distribution. This could be a manifestation of intracranial atherosclerotic disease or vasculitis. There is also  mild irregularity of the more distal right MCA vessels again possibly due to intracranial atherosclerotic disease or vasculitis. No focal central stenosis is suspected. Study is performed with rapid technique for stroke evaluation and is therefore  limited for assessment for intracranial aneurysm.    There are degenerative changes in the cervical spine. The patient is edentulous.      Impression:         1. There is somewhat irregular calcified plaque at the right carotid bifurcation with about 50% diameter stenosis by NASCET criteria.  2. There is been a previous left-sided carotid endarterectomy. There is less than 10% diameter stenosis at the distal end of the endarterectomy.  3. Both vertebral arteries are patent and codominant. Likely mild to moderate stenosis near their origins due to calcified plaque. Both supply the basilar.  4. There is no intracranial vascular cut off or focal central stenosis. There is an irregular appearance to the more distal left posterior cerebral artery distribution and more distal right MCA territory, probably due to intracranial atherosclerotic  disease with vasculitis as the main differential consideration.  5. Likely tiny infundibuli origins of tiny posterior communicating arteries bilaterally.  5. Great vessel origins from the arch are not well included in the field-of-view. Likely some narrowing at the proximal right and left common carotid arteries.    Signer Name: Alexandra Alfaro MD   Signed: 8/16/2020 10:09 PM   Workstation Name: [email or web address]    [facility]    CT Angiogram Neck [[id number]] Collected:  08/16/20 2209     Updated:  08/16/20 2211    Narrative:       INDICATION:   Ectasia. Last seen well around 2100 last night    TECHNIQUE:   CT angiogram of the head and neck with  contrast. 3-D reformatted images were acquired. Evaluation for a significant carotid arterial stenosis is based on the NASCET criteria. Radiation dose reduction techniques included automated exposure control or  exposure modulation based on body size. Radiation audit for number of CT and nuclear cardiology exams performed in the last year:  1. Study performed during the intravenous administration of nonionic contrast media. The doses recorded in the patient's  chart. This also precontrast imaging.    COMPARISON:   CT perfusion study earlier today.    FINDINGS:   CTA neck:  There are vascular calcifications at the aortic arch. Imaging begins above the great vessel origins. There are vascular calcifications at least involving the great vessel origins. I cannot accurately estimate the degree of stenosis. There is  also vascular calcification involving the origin of the right subclavian artery.    The right carotid system shows mild narrowing at the origin of the right common carotid artery. There is partially calcified plaque at the right carotid bifurcation. By NASCET criteria there is about 50% diameter stenosis of the proximal right internal  carotid artery. There is calcified plaque at the right carotid siphon with likely mild stenosis.    There is likely at least mild stenosis proximal left common carotid artery. There are postoperative changes at the left carotid bifurcation consistent with previous endarterectomy. The distal left common carotid artery is capacious. There is mild  narrowing at the origin of the left external carotid artery. There is an irregular appearance to the proximal left internal carotid artery at the distal postoperative bed with mild, less than 10% diameter stenosis by NASCET criteria. There is calcified  plaque at the left carotid siphon with likely mild stenosis.    There is calcified plaque at the origin of the left vertebral artery and involving the proximal right vertebral artery.  Resultant narrowing is mild to moderate bilaterally. The vessels are codominant and both supply the basilar.    CTA head:  There is no intracranial vascular cut off. If there is an anterior communicating artery present it is tiny. There our likely tiny posterior communicating arteries bilaterally. There are tiny, 1 to 2 mm infundibuli at the origin of the  bilateral posterior communicators. Dural venous sinuses are patent. There is mild irregularity of the distal left posterior cerebral artery distribution. This could be a manifestation of intracranial atherosclerotic disease or vasculitis. There is also  mild irregularity of the more distal right MCA vessels again possibly due to intracranial atherosclerotic disease or vasculitis. No focal central stenosis is suspected. Study is performed with rapid technique for stroke evaluation and is therefore  limited for assessment for intracranial aneurysm.    There are degenerative changes in the cervical spine. The patient is edentulous.      Impression:         1. There is somewhat irregular calcified plaque at the right carotid bifurcation with about 50% diameter stenosis by NASCET criteria.  2. There is been a previous left-sided carotid endarterectomy. There is less than 10% diameter stenosis at the distal end of the endarterectomy.  3. Both vertebral arteries are patent and codominant. Likely mild to moderate stenosis near their origins due to calcified plaque. Both supply the basilar.  4. There is no intracranial vascular cut off or focal central stenosis. There is an irregular appearance to the more distal left posterior cerebral artery distribution and more distal right MCA territory, probably due to intracranial atherosclerotic  disease with vasculitis as the main differential consideration.  5. Likely tiny infundibuli origins of tiny posterior communicating arteries bilaterally.  5. Great vessel origins from the arch are not well included in the field-of-view.  Likely some narrowing at the proximal right and left common carotid arteries.    Signer Name: Alexandra Alfaro MD   Signed: 8/16/2020 10:09 PM   Workstation Name: [facility]    [facility]    XR Chest 1 View [[id number]] Collected:  08/16/20 2202     Updated:  08/16/20 2205    Narrative:       CR Chest 1 Vw    INDICATION:   Stroke protocol chest x-ray.    Evaluate for aspiration pneumonia     COMPARISON:    None available.    FINDINGS:  Single portable AP view(s) of the chest.        The heart and mediastinal contours are normal. The lungs are clear. No pneumothorax or pleural effusion.       Impression:       No acute cardiopulmonary findings.    Signer Name: Rishi Pride MD   Signed: 8/16/2020 10:02 PM   Workstation [facility]: [email or web address]    [facility]    CT Cerebral Perfusion With & Without Contrast [[id number]] Collected:  08/16/20 2135     Updated:  08/16/20 2137    Narrative:       CT CEREBRAL PERFUSION W WO CONTRAST    HISTORY:   TIA the facial. Last seen well around 2100 last night.    TECHNIQUE:   Axial CT images of the brain without and with intravenous contrast using cerebral perfusion protocol. Post-processing parametric maps were created using RAPID software and reviewed. Radiation dose reduction techniques included automated exposure control  or exposure modulation based on body size. CT and nuclear cardiology exams in the last 12 months: 0.    COMPARISON:   None    FINDINGS:   Arterial input function is optimal.     No core infarct or ischemic penumbra is documented.      Impression:       Essentially normal CT brain perfusion.          Signer Name: Alexandra Alfaro MD   Signed: 8/16/2020 9:35 PM   Workstation Name: [facility]    [facility]           Labs reviewed from external ED.  WBC-12, neutrophils 74, hemoglobin of 12, platelet 460 INR 0.9  UA-negative  Lactic acid 1.2  BUN/creatinine-   /0.6, sodium 137, potassium 4.4, calcium 10.2, LFTs  WNL, albumin 3.9    Ekg-  Cxr-    Assessment/Plan   Active Hospital Problems    Diagnosis POA   • Essential hypertension [I10] Unknown   • GERD without esophagitis [K21.9] Unknown   • COPD (chronic obstructive pulmonary disease) (CMS/HCC) [J44.9] Unknown   • Hypothyroidism (acquired) [E03.9] Unknown   • Hyperlipemia [E78.5] Unknown   • Stroke (cerebrum) (CMS/[facility]) [I63.9] Yes       Assessment & Plan     Dysarthria/right facial droop/right upper extremity weakness  - Prelim CT head without contrast is reported as negative,  - Follow-up stroke navigators recommendation.  -CT scan done.  -Will do preprocedural rule out of COVID-19-with ABOTT TESTING.    Lower extremity pain- chronic,?  Related to radiculopathy secondary to her back pain-we will start with Tylenol as needed, denies any bowel bladder incontinence.    TIA-approximately a year back without any residual deficit/?  CAD- Plavix 75 mg p.o. daily  -Aspirin at least not listed on the medication list from the external ED.    Hypertension-hydralazine 25 mg p.o. every 8, Norvasc 10 mg p.o. daily, metoprolol succinate 100 mg p.o. Daily-systolic blood pressure rising above 200, along with being tachycardia-if persistently high, will give Lopressor IV as needed to maintain systolic blood pressure less than 200.    GERD/GI bleed approximately 3 months back-Protonix 40 mg p.o. Daily-we will continue, denies any acute bleeding.    Hyperlipidemia-high-dose statin started as a part of stroke protocol    COPD- still active smoker- currently maintaining oxygen sats 97% on room air, chest x-ray ordered, do not appear to be in exacerbation    Hypothyroid- Synthroid 100 mcg p.o. daily-currently on hold, need to restart once cleared by speech.    pvd- stents in both lower ext.    DVT prophylaxis:    -TEDs/SCDs  -Lovenox-we will hold for now.    CODE STATUS:    Code Status and Medical Interventions:   Ordered at: 08/16/20 2055     Level Of Support Discussed With:    Patient      Code Status:    CPR     Medical Interventions (Level of Support Prior to Arrest):    Full       Admission Status:  I believe this patient meets inpt criteria, 2nd to acute CVA, wup and RX.    Electronically signed by Glo Man MD, 08/16/20, 9:04 PM.    Addendum    Elevated wbc, and procal- with clear chest x ray, not hypoxic, no dysuria.  Co of lower back and leg pain-esr, crp if elevated -will do radiological wup of her spine.  UA- also added.    Glo Man MD  08/17/20  00:29

## 2020-08-17 NOTE — PLAN OF CARE
Problem: Patient Care Overview  Goal: Plan of Care Review  Flowsheets  Taken 8/17/2020 1411 by Caprice Hurt MS CCC-SLP  Plan of Care Reviewed With: patient  Taken 8/17/2020 1305 by Luisa Crabtree OT  Outcome Summary: OT eval complete.  Pt presents with expressive aphasia,  back pain, weakness, decreased coordination and balance limiting independence with self care.  Pt required min A to don socks in long sitting,  min A with post toileting hygiene.  Pt min A  supine to sit,  mod A  x 2 toilet transfer,  mod A x2 to ambulate to bathroom and back to bed with RW.  OT will follow to advance pt toward PLOF with self care.  Recommend IP rehab upon d/c if pt agreeable.

## 2020-08-17 NOTE — DISCHARGE INSTR - DIET
MBS/VFSS Reason for Referral 8/17/2020    Patient was referred for a MBS to assess the efficiency of his/her swallow function, rule out aspiration and make recommendations regarding safe dietary consistencies, effective compensatory strategies, and safe eating environment.           Recommendations/Treatment  SLP Swallowing Diagnosis: moderate, oral dysfunction, pharyngeal dysfunction  Functional Impact: risk of aspiration/pneumonia  Rehab Potential/Prognosis, Swallowing: good, to achieve stated therapy goals  Swallow Criteria for Skilled Therapeutic Interventions Met: demonstrates skilled criteria  Therapy Frequency (Swallow): 5 days per week  Predicted Duration Therapy Intervention (Days): until discharge  SLP Diet Recommendation: puree, honey thick liquids  Recommended Diagnostics: VFSS (MBS)  Recommended Precautions and Strategies: upright posture during/after eating, small bites of food and sips of liquid, no straw, alternate between small bites of food and sips of liquid  SLP Rec. for Method of Medication Administration: meds whole, with pudding or applesauce, as tolerated  Monitor for Signs of Aspiration: yes, notify SLP if any concerns  Anticipated Dischage Disposition (SLP): unknown, anticipate therapy at next level of care

## 2020-08-17 NOTE — MBS/VFSS/FEES
Acute Care - Speech Language Pathology   Swallow Initial Evaluation  [place]   Modified Barium Swallow Study (MBS)       Patient Name: Morelia Madden  : 1945  MRN: [id number]  Today's Date: 2020        Referring Physician: JANICE Garcia      Admit Date: 2020    Visit Dx:     ICD-10-CM ICD-9-CM   1. Dysarthria R47.1 784.51   2. Oropharyngeal dysphagia R13.12 787.22     Patient Active Problem List   Diagnosis   • Stroke (cerebrum) (CMS/HCC)   • Essential hypertension   • GERD without esophagitis   • COPD (chronic obstructive pulmonary disease) (CMS/[facility])   • Hypothyroidism (acquired)   • Hyperlipemia     No past medical history on file.  No past surgical history on file.     SWALLOW EVALUATION (last 72 hours)      SLP Adult Swallow Evaluation     Row Name 20 1100 20 1000 20 0915             Rehab Evaluation    Document Type  evaluation  -DV  --  --      Subjective Information  no complaints  -DV  --  --      Patient Observations  alert;cooperative  -DV  --  --      Patient/Family Observations  none present  -DV  --  --      Patient Effort  good  -DV  --  --      Comment  Evaluation in collaboration with RD.  -DV  --  --         General Information    Patient Profile Reviewed  yes  -DV  --  --      Pertinent History Of Current Problem  See initial eval  -DV  --  --      Current Method of Nutrition  NPO  -DV  --  NPO  -DV      Prior Level of Function-Swallowing  no diet consistency restrictions  -DV  --  no diet consistency restrictions  -DV      Plans/Goals Discussed with  patient  -DV  --  --      Barriers to Rehab  cognitive status  -DV  --  --         Pain Assessment    Additional Documentation  Pain Scale: Word Pre/Post-Treatment (Group)  -DV  --  --         Pain Scale: Word Pre/Post-Treatment    Pain: Word Scale, Pretreatment  0 - no pain  -DV  --  --      Pain: Word Scale, Post-Treatment  0 - no pain  -DV  --  --         Oral Motor and Function    Dentition Assessment  upper  dentures/partial in place;lower dentures/partial in place  -DV  --  --      Secretion Management  --  --  WNL/WFL  -DV      Mucosal Quality  --  --  moist, healthy  -DV      Volitional Cough  --  --  weak  -DV         General Eating/Swallowing Observations    Respiratory Support Currently in Use  room air  -DV  room air  -DV  --      Eating/Swallowing Skills  self-fed;fed by SLP  -DV  self-fed;fed by SLP  -DV  --      Positioning During Eating  upright in chair  -DV  upright in bed  -DV  --      Utensils Used  --  spoon;cup  -DV  --      Consistencies Trialed  --  thin liquids;nectar/syrup-thick liquids;pureed  -DV  --         Respiratory    Respiratory Status  WFL  -DV  WFL  -DV  --      Respiratory Pattern  decrease control/incoordination of breathing swallow  -DV  decrease control/incoordination of breathing swallow  -DV  --         Clinical Swallow Eval    Oral Prep Phase  --  impaired  -DV  --      Oral Residue  --  WFL  -DV  --      Pharyngeal Phase  --  suspected pharyngeal impairment  -DV  --         Oral Prep Concerns    Oral Prep Concerns  --  incomplete or weak lip closure around spoon;anterior loss  -DV  --      Incomplete or Weak Lip Closure Around Spoon  --  all consistencies  -DV  --      Anterior Loss  --  thin;nectar  -DV  --         Pharyngeal Phase Concerns    Pharyngeal Phase Concerns  --  wet vocal quality;multiple swallows;cough;throat clear  -DV  --      Wet Vocal Quality  --  thin  -DV  --      Multiple Swallows  --  all consistencies  -DV  --      Cough  --  thin  -DV  --      Throat Clear  --  thin;nectar  -DV  --      Pharyngeal Phase Concerns, Comment  --  CSE completed: Pt with significant labial/lingual weakness and asymmetry, anterior loss of liquids, consistent cough response w/ thin liquid, inconsistent TC-ing with nectar-thick liquid, and no s/s w/ puree. Rec MBS today, NPO until then.   -DV  --         MBS/VFSS    Utensils Used  spoon;cup  -DV  --  --      Consistencies Trialed   thin liquids;nectar/syrup-thick liquids;honey-thick liquids;pureed;regular textures  -DV  --  --         MBS/VFSS Interpretation    Oral Prep Phase  impaired oral phase of swallowing  -DV  --  --      Oral Transit Phase  impaired  -DV  --  --      Oral Residue  impaired  -DV  --  --         Oral Preparatory Phase    Oral Preparatory Phase  reduced lip closure around utensil;anterior loss;prolonged manipulation  -DV  --  --      Reduced Lip Closure Around Utensil  all consistencies tested;secondary to reduced labial seal  -DV  --  --      Anterior Loss  thin liquids;secondary to reduced labial seal  -DV  --  --      Prolonged Manipulation  all consistencies tested  -DV  --  --         Oral Transit Phase    Impaired Oral Transit Phase  tongue pumping;premature spillage of liquids into pharynx  -DV  --  --      Delayed Initiation of bolus transit  --  -DV  --  --      Tongue Pumping  all consistencies tested;discoordination of lingual movement  -DV  --  --      Premature Spillage of Liquids into Pharynx  thin liquids;nectar-thick liquids;honey-thick liquids;pudding/puree;secondary to reduced lingual control  -DV  --  --         Oral Residue    Impaired Oral Residue  diffuse residue throughout oral cavity  -DV  --  --      Diffuse Residue throughout Oral Cavity  all consistencies tested;secondary to reduced lingual strength;secondary to reduced lingual range of motion  -DV  --  --      Response to Oral Residue  unable to clear residue;with liquid wash;other (see comments) compensatory options not trialed 2' cognitive status  -DV  --  --         Initiation of Pharyngeal Swallow    Initiation of Pharyngeal Swallow  bolus in pyriform sinuses  -DV  --  --      Pharyngeal Phase  impaired pharyngeal phase of swallowing  -DV  --  --      Penetration Before the Swallow  thin liquids;nectar-thick liquids;secondary to reduced back of tongue control;secondary to delayed swallow initiation or mistiming  -DV  --  --      Penetration  During the Swallow  thin liquids;nectar-thick liquids;secondary to delayed swallow initiation or mistiming;secondary to reduced laryngeal elevation;secondary to reduced vestibular closure  -DV  --  --      Aspiration During the Swallow  thin liquids;nectar-thick liquids;secondary to delayed swallow initiation or mistiming;secondary to reduced laryngeal elevation;secondary to reduced vestibular closure  -DV  --  --      Response to Penetration  no response  -DV  --  --      Response to Aspiration  no response, silent aspiration;could not clear subglottic material;weak cough/throat clear  -DV  --  --      Rosenbek's Scale  thin:;nectar:;8--->level 8;honey:;pudding/puree:;regular textures:;1--->level 1  -DV  --  --      Pharyngeal Residue  all consistencies tested;diffuse within pharynx;secondary to reduced base of tongue retraction;secondary to reduced posterior pharyngeal wall stripping;secondary to reduced laryngeal elevation;secondary to reduced hyolaryngeal excursion  -DV  --  --      Response to Residue  unable to clear residue  -DV  --  --      Attempted Compensatory Maneuvers  bolus size;bolus presentation style  -DV  --  --      Response to Attempted Compensatory Maneuvers  did not prevent penetration;did not prevent aspiration;did not reduce residue  -DV  --  --      Pharyngeal Phase, Comment  MBS completed: Reduced labial strength resulted in anterior loss of thin liquids. Discoordination of lingual movement resulted in prolonged manipulation of bolus across all consistencies, but adequate for pudding and liquid consistencies. Reduced back of tongue control resulted in pre-mature spillage of bolus with thin, NTL, HTL, and pudding consistencies. Spillage was most often to the valleculae and spilling over the superior edge of the epiglottis, x1 spillage to the pyriform sinuses with thin liquids. Delayed initiation of the swallow with thin, NTL, HTL, and pudding. Penetration: back of tongue control and delayed  initiation of swallow resulted in consistent penetration of pre-spilled thin and nectar thick liquids before the swallow, that did not clear from laryngeal vestibule despite cued cough. Aspiration: reduced hyolaryngeal elevation/excursion, reduced vestibular closure, and delayed initiation of the swallow resulted in aspiration of thin and nectar thick liquids that did not clear despite cued cough. Aspiration was silent. No penetration/aspiration with honey-thick liquids, pudding, or regular consistencies. Reduced tongue base retraction, pharyngeal stripping, and hyolaryngeal elevation/excursion resulted in mild, diffuse pharyngeal residue that did not clear with subsequent trials. Additional compensations not trialed due to pt's cognitive status.  -DV  --  --         Clinical Impression    SLP Swallowing Diagnosis  moderate;oral dysfunction;pharyngeal dysfunction  -DV  mild-moderate;oral dysfunction;suspected pharyngeal dysfunction  -DV  --      Functional Impact  risk of aspiration/pneumonia  -DV  risk of aspiration/pneumonia  -DV  --      Rehab Potential/Prognosis, Swallowing  good, to achieve stated therapy goals  -DV  good, to achieve stated therapy goals  -DV  --      Swallow Criteria for Skilled Therapeutic Interventions Met  demonstrates skilled criteria  -DV  demonstrates skilled criteria  -DV  --         Recommendations    Therapy Frequency (Swallow)  5 days per week  -DV  --  --      Predicted Duration Therapy Intervention (Days)  until discharge  -DV  until discharge  -DV  --      SLP Diet Recommendation  puree;honey thick liquids  -DV  NPO  -DV  --      Recommended Diagnostics  --  VFSS (MBS)  -DV  --      Recommended Precautions and Strategies  upright posture during/after eating;small bites of food and sips of liquid;no straw;alternate between small bites of food and sips of liquid  -DV  --  --      SLP Rec. for Method of Medication Administration  meds whole;with pudding or applesauce;as tolerated   -DV  meds via alternate route  -DV  --      Monitor for Signs of Aspiration  yes;notify SLP if any concerns  -DV  --  --      Anticipated Dischage Disposition (SLP)  unknown;anticipate therapy at next level of care  -DV  unknown;anticipate therapy at next level of care  -DV  --        User Key  (r) = Recorded By, (t) = Taken By, (c) = Cosigned By    Initials Name Effective Dates    Caprice Elias, MS CCC-SLP 02/28/20 -           EDUCATION  The patient has been educated in the following areas:   Dysphagia (Swallowing Impairment) Modified Diet Instruction.    SLP Recommendation and Plan  SLP Swallowing Diagnosis: moderate, oral dysfunction, pharyngeal dysfunction  SLP Diet Recommendation: puree, honey thick liquids  Recommended Precautions and Strategies: upright posture during/after eating, small bites of food and sips of liquid, no straw, alternate between small bites of food and sips of liquid  SLP Rec. for Method of Medication Administration: meds whole, with pudding or applesauce, as tolerated     Monitor for Signs of Aspiration: yes, notify SLP if any concerns  Recommended Diagnostics: VFSS (MBS)  Swallow Criteria for Skilled Therapeutic Interventions Met: demonstrates skilled criteria  Anticipated Dischage Disposition (SLP): unknown, anticipate therapy at next level of care  Rehab Potential/Prognosis, Swallowing: good, to achieve stated therapy goals  Therapy Frequency (Swallow): 5 days per week  Predicted Duration Therapy Intervention (Days): until discharge       Plan of Care Reviewed With: patient    SLP GOALS     Row Name 08/17/20 1100             Oral Nutrition/Hydration Goal 1 (SLP)    Oral Nutrition/Hydration Goal 1, SLP  LTG: Pt will demonstrate functional swallow for soft/thin diet with use of compensatory strategies as needed with 100% accuracy.  -DV      Time Frame (Oral Nutrition/Hydration Goal 1, SLP)  short term goal (STG)  -DV         Oral Nutrition/Hydration Goal 2 (SLP)    Oral  Nutrition/Hydration Goal 2, SLP  Pt will tolerate current diet of puree and honey-thick liquids with no s/sx aspiration with 100% accuracy.  -DV      Time Frame (Oral Nutrition/Hydration Goal 2, SLP)  short term goal (STG)  -DV         Oral Nutrition/Hydration Goal (SLP)    Oral Nutrition/Hydration Goal, SLP  Pt will tolerate trials of thin liquids and soft solids with improved oral manipulation, mastication, and no s/sx aspiration with 100% accuracy.  -DV      Time Frame (Oral Nutrition/Hydration Goal, SLP)  short term goal (STG)  -DV         Labial Strengthening Goal 1 (SLP)    Activity (Labial Strengthening Goal 1, SLP)  increase labial tone  -DV      Increase Labial Tone  labial resistance exercises  -DV      [place]/Accuracy (Labial Strengthening Goal 1, SLP)  with minimal cues (75-90% accuracy)  -DV      Time Frame (Labial Strengthening Goal 1, SLP)  short term goal (STG)  -DV         Lingual Strengthening Goal 1 (SLP)    Activity (Lingual Strengthening Goal 1, SLP)  increase tongue back strength;increase lingual tone/sensation/control/coordination/movement  -DV      Increase Lingual Tone/Sensation/Control/Coordination/Movement  lingual movement exercises  -DV      Increase Tongue Back Strength  lingual resistance exercises  -DV      [place]/Accuracy (Lingual Strengthening Goal 1, SLP)  with minimal cues (75-90% accuracy)  -DV      Time Frame (Lingual Strengthening Goal 1, SLP)  short term goal (STG)  -DV         Pharyngeal Strengthening Exercise Goal 1 (SLP)    Activity (Pharyngeal Strengthening Goal 1, SLP)  increase timing;increase superior movement of the hyolaryngeal complex;increase anterior movement of the hyolaryngeal complex;increase closure at entrance to airway/closure of airway at glottis;increase squeeze/positive pressure generation;increase tongue base retraction  -DV      Increase Timing  prepping - 3 second prep or suck swallow or 3-step swallow  -DV      Increase Superior Movement of  the Hyolaryngeal Complex  falsetto;Mendelsohn  -DV      Increase Anterior Movement of the Hyolaryngeal Complex  shaker;chin tuck against resistance (CTAR)  -DV      Increase Closure at Entrance to Airway/Closure of Airway at Glottis  breath hold exercises;super-supraglottic swallow  -DV      Increase Squeeze/Positive Pressure Generation  hard effortful swallow  -DV      Increase Tongue Base Retraction  jaylon  -DV      [place]/Accuracy (Pharyngeal Strengthening Goal 1, SLP)  with minimal cues (75-90% accuracy)  -DV      Time Frame (Pharyngeal Strengthening Goal 1, SLP)  short term goal (STG)  -DV         Swallow Management Recall Goal 1 (SLP)    Activity (Swallow Management Recall Goal 1, SLP)  recall of;safe diet/liquid level;compensatory swallow strategies/techniques  -DV      [place]/Accuracy (Swallow Management Recall Goal 1, SLP)  with minimal cues (75-90% accuracy)  -DV      Time Frame (Swallow Management Recall Goal 1, SLP)  short term goal (STG)  -DV         Swallow Compensatory Strategies Goal 1 (SLP)    Activity (Swallow Compensatory Strategies/Techniques Goal 1, SLP)  compensatory strategies;small bites;small cup sips;alternate food/liquid intake  -DV      [place]/Accuracy (Swallow Compensatory Strategies/Techniques Goal 1, SLP)  with minimal cues (75-90% accuracy)  -DV      Time Frame (Swallow Compensatory Strategies/Techniques Goal 1, SLP)  short term goal (STG)  -DV        User Key  (r) = Recorded By, (t) = Taken By, (c) = Cosigned By    Initials Name Provider Type    Caprice Elias MS CCC-SLP Speech and Language Pathologist             Time Calculation:   Time Calculation- SLP     Row Name 08/17/20 1413 08/17/20 1036          Time Calculation- SLP    SLP Start Time  1100  -DV  0915  -DV     SLP Received On  08/17/20  -DV  08/17/20  -DV       User Key  (r) = Recorded By, (t) = Taken By, (c) = Cosigned By    Initials Name Provider Type    Caprice Elias MS CCC-SLP Speech and Language  Pathologist          Therapy Charges for Today     Code Description Service Date Service Provider Modifiers Qty    38576445687 HC ST EVAL ORAL PHARYNG SWALLOW 2 8/17/2020 Caprice Hurt MS CCC-SLP GN 1    80061103098 HC ST EVAL SPEECH AND PROD W LANG  2 8/17/2020 Caprice Hurt MS CCC-SLP GN 1    [id number] HC ST MOTION FLUORO EVAL SWALLOW 8 8/17/2020 Caprice Hurt MS CCC-SLP GN 1            Patient was not wearing a face mask and did exhibit coughing during this therapy encounter.  Procedure performed was aerosolizing, involved close contact (within 6 feet for at least 15 minutes or longer), and did not involve contact with infectious secretions or specimens.  Therapist used appropriate personal protective equipment including gloves, standard procedure mask and eye protection.  Appropriate PPE was worn during the entire therapy session.  Hand hygiene was completed before and after therapy session.        MS JAGDISH Post  8/17/2020

## 2020-08-17 NOTE — PLAN OF CARE
Problem: Patient Care Overview  Goal: Plan of Care Review  8/17/2020 1411 by Caprice Hurt MS CCC-SLP  Outcome: Ongoing (interventions implemented as appropriate)  Flowsheets (Taken 8/17/2020 1100)  Plan of Care Reviewed With: patient  Note:   SLP evaluation completed. Will address dysphagia in tx. Please see note for further details and recommendations.

## 2020-08-17 NOTE — PLAN OF CARE
VSS. Dysarthria present. Unable to remember where she is at or why she is here. Right sided facial droop present. Severe back pain that radiates into lower extremities. Will continue to monitor.

## 2020-08-17 NOTE — THERAPY EVALUATION
Acute Care - Occupational Therapy Initial Evaluation  [facility]     Patient Name: Morelia Madden  : 1945  MRN: [id number]  Today's Date: 2020     Date of Referral to OT: 20  Referring Physician: JANICE Garcia    Admit Date: 2020       ICD-10-CM ICD-9-CM   1. Dysarthria R47.1 784.51   2. Oropharyngeal dysphagia R13.12 787.22     Patient Active Problem List   Diagnosis   • Stroke (cerebrum) (CMS/HCC)   • Essential hypertension   • GERD without esophagitis   • COPD (chronic obstructive pulmonary disease) (CMS/[facility])   • Hypothyroidism (acquired)   • Hyperlipemia     No past medical history on file.  No past surgical history on file.       OT ASSESSMENT FLOWSHEET (last 12 hours)      Occupational Therapy Evaluation     Row Name 20 1305                   OT Evaluation Time/Intention    Document Type  evaluation  -AC        Mode of Treatment  occupational therapy  -AC        Patient Effort  good  -AC           General Information    Patient Profile Reviewed?  yes  -AC        Referring Physician  JANICE Garcia  -AC        Prior Level of Function  -- CM note reports dtr assists pt with care  -AC        Equipment Currently Used at Home  walker, rolling  -AC        Pertinent History of Current Functional Problem  Pt admit with aphasia, facial droop, and back pain that radiates to LEs  -AC        Existing Precautions/Restrictions  (S) fall bedrest with bathroom exception  -AC        Limitations/Impairments  safety/cognitive aphasia, speech is difficult to understand  -AC        Barriers to Rehab  medically complex  -AC           Relationship/Environment    Primary Source of Support/Comfort  child(moreno)  -AC        Lives With  child(moreno), adult;grandchild(moreno)  -           Resource/Environmental Concerns    Current Living Arrangements  home/apartment/condo  -           Cognitive Assessment/Interventions    Additional Documentation  Cognitive Assessment/Intervention (Group)  -AC            Cognitive Assessment/Intervention- PT/OT    Orientation Status (Cognition)  oriented x 3  -        Follows Commands (Cognition)  follows one step commands;75-90% accuracy;repetition of directions required;verbal cues/prompting required  -        Cognitive Assessment/Intervention Comment  expressive aphasia difficult to understand  -           Safety Issues, Functional Mobility    Impairments Affecting Function (Mobility)  balance;cognition;coordination;endurance/activity tolerance;pain;range of motion (ROM);strength  -           Bed Mobility Assessment/Treatment    Bed Mobility Assessment/Treatment  supine-sit;sit-supine  -        Supine-Sit [place] (Bed Mobility)  verbal cues;minimum assist (75% patient effort);2 person assist  -        Sit-Supine [place] (Bed Mobility)  verbal cues;moderate assist (50% patient effort);2 person assist  -        Bed Mobility, Safety Issues  decreased use of arms for pushing/pulling;decreased use of legs for bridging/pushing  -        Assistive Device (Bed Mobility)  bed rails;head of bed elevated  -           Functional Mobility    Functional Mobility- Ind. Level  verbal cues required;moderate assist (50% patient effort);2 person assist required  -        Functional Mobility- Device  rolling walker  -        Functional Mobility-Distance (Feet)  28  -        Functional Mobility- Comment  1 seated restbreak  -           Transfer Assessment/Treatment    Transfer Assessment/Treatment  sit-stand transfer;stand-sit transfer;toilet transfer  -           Sit-Stand Transfer    Sit-Stand [place] (Transfers)  verbal cues;moderate assist (50% patient effort);2 person assist  -        Assistive Device (Sit-Stand Transfers)  walker, front-wheeled  -           Stand-Sit Transfer    Stand-Sit [place] (Transfers)  verbal cues;minimum assist (75% patient effort)  -        Assistive Device (Stand-Sit Transfers)  walker, front-wheeled  -            Toilet Transfer    Type (Toilet Transfer)  sit-stand;stand-sit  -        [place] Level (Toilet Transfer)  moderate assist (50% patient effort);2 person assist  -        Assistive Device (Toilet Transfer)  walker, front-wheeled  -           ADL Assessment/Intervention    BADL Assessment/Intervention  lower body dressing;toileting  -           Lower Body Dressing Assessment/Training    Lower Body Dressing [place] Level  don;socks;minimum assist (75% patient effort)  -        Lower Body Dressing Position  long sitting  -           Toileting Assessment/Training    [place] Level (Toileting)  perform perineal hygiene;minimum assist (75% patient effort)  -        Toileting Position  unsupported sitting;supported standing  -           BADL Safety/Performance    Skilled BADL Treatment/Intervention  BADL process/adaptation training  -           General ROM    GENERAL ROM COMMENTS  R shld FE 90 degrees, otherwise BUE AROM WFL  -           MMT (Manual Muscle Testing)    General MMT Comments  R shld 2+/5, R distally 4-/5,  L shld 3+/5,  L distally 4-/5  -           Motor Assessment/Interventions    Additional Documentation  Fine Motor Testing & Training (Group);Gross Motor Coordination (Group)  -           Gross Motor Coordination    Gross Motor Impairments  finger to nose impaired more on L than R  -AC           Fine Motor Testing & Training    Fine Motor Tests  -- difficulty with in hand manipulation L  -AC           Sensory Assessment/Intervention    Sensory General Assessment  light touch sensation deficits identified  -           Light Touch Sensation Assessment    Left Upper Extremity: Light Touch Sensation Assessment  mild impairment, 75% or more correct responses  -        Comment, Left Upper Extremity: Light Touch Sensation Assessment  L hand numbness  -           Positioning and Restraints    Pre-Treatment Position  in bed  -        Post Treatment Position  bed  -AC            Pain Assessment    Additional Documentation  Pain Scale: Numbers Pre/Post-Treatment (Group)  -AC           Pain Scale: Numbers Pre/Post-Treatment    Pain Scale: Numbers, Pretreatment  7/10  -AC        Pain Scale: Numbers, Post-Treatment  7/10  -AC        Pain Location - Orientation  lower  -AC        Pain Location  back  -AC        Pain Intervention(s)  Repositioned notified RN  -AC           Plan of Care Review    Plan of Care Reviewed With  patient  -AC        Outcome Summary  OT eval complete.  Pt presents with expressive aphasia,  back pain, weakness, decreased coordination and balance limiting independence with self care.  Pt required min A to don socks in long sitting,  min A with post toileting hygiene.  Pt min A  supine to sit,  mod A  x 2 toilet transfer,  mod A x2 to ambulate to bathroom and back to bed with RW.  OT will follow to advance pt toward PLOF with self care.  Recommend IP rehab upon d/c if pt agreeable.   -AC           Clinical Impression (OT)    Date of Referral to OT  08/16/20  -AC        OT Diagnosis  ADL decline  -AC        Patient/Family Goals Statement (OT Eval)  pt did not state  -AC        Rehab Potential (OT Eval)  good, to achieve stated therapy goals  -AC        Therapy Frequency (OT Eval)  daily  -AC        Care Plan Review (OT)  evaluation/treatment results reviewed  -AC        Anticipated Discharge Disposition (OT)  inpatient rehabilitation facility  -AC           Vital Signs    Post Systolic BP Rehab  174  -AC        Post Treatment Diastolic BP  91  -AC        Pretreatment Heart Rate (beats/min)  114  -AC        Intratreatment Heart Rate (beats/min)  125  -AC        Posttreatment Heart Rate (beats/min)  97  -AC        Post SpO2 (%)  95  -AC        O2 Delivery Post Treatment  room air  -AC        Pre Patient Position  Supine  -AC        Intra Patient Position  Standing  -AC        Post Patient Position  Supine  -AC           Planned OT Interventions    Planned Therapy  Interventions (OT Eval)  activity tolerance training;BADL retraining;functional balance retraining;occupation/activity based interventions;strengthening exercise;transfer/mobility retraining  -AC           OT Goals    Bed Mobility Goal Selection (OT)  bed mobility, OT goal 1  -AC        Transfer Goal Selection (OT)  transfer, OT goal 1  -AC        Toileting Goal Selection (OT)  toileting, OT goal 1  -AC        Strength Goal Selection (OT)  strength, OT goal 1  -AC        Additional Documentation  Strength Goal Selection (OT) (Row)  -AC           Bed Mobility Goal 1 (OT)    Activity/Assistive Device (Bed Mobility Goal 1, OT)  sit to supine;supine to sit  -AC        [place] Level/Cues Needed (Bed Mobility Goal 1, OT)  contact guard assist  -AC        Time Frame (Bed Mobility Goal 1, OT)  by discharge  -AC        Progress/Outcomes (Bed Mobility Goal 1, OT)  goal ongoing  -AC           Transfer Goal 1 (OT)    Activity/Assistive Device (Transfer Goal 1, OT)  toilet;commode, bedside without drop arms;walker, rolling  -AC        [place] Level/Cues Needed (Transfer Goal 1, OT)  minimum assist (75% or more patient effort)  -AC        Time Frame (Transfer Goal 1, OT)  by discharge  -AC        Progress/Outcome (Transfer Goal 1, OT)  goal ongoing  -AC           Toileting Goal 1 (OT)    Activity/Device (Toileting Goal 1, OT)  adjust/manage clothing;perform perineal hygiene  -AC        [place] Level/Cues Needed (Toileting Goal 1, OT)  supervision required  -AC        Time Frame (Toileting Goal 1, OT)  by discharge  -AC        Progress/Outcome (Toileting Goal 1, OT)  goal ongoing  -AC           Strength Goal 1 (OT)    Strength Goal 1 (OT)  pt will increase BUE 1 MMG as needed to support ADLs  -AC        Time Frame (Strength Goal 1, OT)  by discharge  -AC        Progress/Outcome (Strength Goal 1, OT)  goal ongoing  -AC           Living Environment    Home Accessibility  -- walk in shower  -AC          User Key  (r) =  Recorded By, (t) = Taken By, (c) = Cosigned By    Initials Name Effective Dates     Luisa Crabtree, OT 06/23/15 -          Occupational Therapy Education                 Title: PT OT SLP Therapies (In Progress)     Topic: Occupational Therapy (In Progress)     Point: ADL training (Done)     Description:   Instruct learner(s) on proper safety adaptation and remediation techniques during self care or transfers.   Instruct in proper use of assistive devices.              Learning Progress Summary           Patient Acceptance, E, VU by  at 8/17/2020 7157    Comment:  benefits of activity, role of OT                   Point: Home exercise program (Not Started)     Description:   Instruct learner(s) on appropriate technique for monitoring, assisting and/or progressing therapeutic exercises/activities.              Learner Progress:   Not documented in this visit.          Point: Precautions (Not Started)     Description:   Instruct learner(s) on prescribed precautions during self-care and functional transfers.              Learner Progress:   Not documented in this visit.          Point: Body mechanics (Not Started)     Description:   Instruct learner(s) on proper positioning and spine alignment during self-care, functional mobility activities and/or exercises.              Learner Progress:   Not documented in this visit.                      User Key     Initials Effective Dates Name Provider Type Discipline     06/23/15 -  Luisa Crabtree, OT Occupational Therapist OT                  OT Recommendation and Plan  Outcome Summary/Treatment Plan (OT)  Anticipated Discharge Disposition (OT): inpatient rehabilitation facility  Planned Therapy Interventions (OT Eval): activity tolerance training, BADL retraining, functional balance retraining, occupation/activity based interventions, strengthening exercise, transfer/mobility retraining  Therapy Frequency (OT Eval): daily  Plan of Care Review  Plan of Care Reviewed With:  patient  Plan of Care Reviewed With: patient  Outcome Summary: OT zahida complete.  Pt presents with expressive aphasia,  back pain, weakness, decreased coordination and balance limiting independence with self care.  Pt required min A to don socks in long sitting,  min A with post toileting hygiene.  Pt min A  supine to sit,  mod A  x 2 toilet transfer,  mod A x2 to ambulate to bathroom and back to bed with RW.  OT will follow to advance pt toward PLOF with self care.  Recommend IP rehab upon d/c if pt agreeable.     Outcome Measures     Row Name 08/17/20 1309             How much help from another is currently needed...    Putting on and taking off regular lower body clothing?  3  -AC      Bathing (including washing, rinsing, and drying)  2  -AC      Toileting (which includes using toilet bed pan or urinal)  3  -AC      Putting on and taking off regular upper body clothing  3  -AC      Taking care of personal grooming (such as brushing teeth)  3  -AC      Eating meals  3  -AC      AM-PAC 6 Clicks Score (OT)  17  -         Modified [place] Scale    Modified [place] Scale  4 - Moderately severe disability.  Unable to walk without assistance, and unable to attend to own bodily needs without assistance.  -AC         Functional Assessment    Outcome Measure Options  AM-PAC 6 Clicks Daily Activity (OT);Modified [place]  -AC        User Key  (r) = Recorded By, (t) = Taken By, (c) = Cosigned By    Initials Name Provider Type    Luisa Blair OT Occupational Therapist          Time Calculation:   Time Calculation- OT     Row Name 08/17/20 6131             Time Calculation- OT    OT Start Time  1305  -AC      OT Received On  08/17/20  -      OT Goal Re-Cert Due Date  08/27/20  -        User Key  (r) = Recorded By, (t) = Taken By, (c) = Cosigned By    Initials Name Provider Type    Luisa Blair, OT Occupational Therapist        Therapy Charges for Today     Code Description Service Date Service Provider Modifiers  Qty    [id number]  OT EVAL MOD COMPLEXITY 4 8/17/2020 Luisa Crabtree, OT GO 1               Luisa Crabtree, HALIMA  8/17/2020

## 2020-08-17 NOTE — PROGRESS NOTES
Stroke Progress Note       Chief Complaint:  Left facial droop    Subjective    Subjective     Subjective:  Claims to have back pain     Review of Systems   Constitutional: looks fatigued   HENT: Negative for nosebleeds and rhinorrhea.    Eyes: Negative for redness.   Respiratory: Negative for cough.    Gastrointestinal: Negative for anal bleeding.   Endocrine: Negative for polydipsia.   Genitourinary: Negative for enuresis and urgency.   Musculoskeletal: back pain  Neurological: Negative for tremors.   Psychiatric/Behavioral: Negative for hallucinations.     Objective      Temp:  [97.8 °F (36.6 °C)-98.2 °F (36.8 °C)] 97.8 °F (36.6 °C)  Heart Rate:  [] 88  Resp:  [16-18] 18  BP: (156-204)/() 178/81    Neurological Exam  Mental Status  Awake. Oriented only to person, place and time. Severe dysarthria present. Expressive aphasia present.     Cranial Nerves  CN II: Vision test: Partial gaze palsy to the right. Partial gaze palsy to the right.  CN III, IV, VI: Normal lids and orbits bilaterally. Pupils equal round and reactive to light bilaterally.  CN V:  Left: Diminished sensation of the entire left side of the face.  CN VII:  Left: Left facial droop.  CN VIII: Hearing is normal.  CN IX, X: Palate elevates symmetrically  Right: Gag is intact.  Unable to assess XI and XII. Inconsistency with following commands. .     Motor     LUE 4-/5, RUE 4+/5   Mild drift in BLE.      Sensory  Light touch abnormality: Left face.      Reflexes                                           Right                      Left  Plantar                           Downgoing                Downgoing     Coordination  Right: Heel-to-shin normal.  Left: Heel-to-shin normal.     Gait  Not assessed.        Physical Exam   Constitutional: cachectic    HENT:   Head: Normocephalic and atraumatic.   Neck: Normal range of motion.   Cardiovascular: Normal rate and regular rhythm.   Pulmonary/Chest: Effort normal and breath sounds normal.      Abdominal: Soft. Bowel sounds are normal.   Skin: Skin is warm and dry.     Results Review:    I reviewed the patient's new clinical results.    Lab Results (last 24 hours)     Procedure Component Value Units Date/Time    P2Y12 Platelet Inhibition [[id number]] Collected:  08/17/20 1243    Specimen:  Blood Updated:  08/17/20 1344     P2Y12 Reactivity Unit 82 PRU     Narrative:       P2Y12 Interpretation:  Pre-Drug normal reference range is 194-418 PRU.  Test results are reported in P2Y12 reaction units (PRU). This measures the extent of platelet aggregation in the presence of P2Y12 inhibitor drugs, such as clopidogrel (Plavix), prasugrel (Effient), ticagrelor (Brilinta), ticlopidine (Ticlid).  P2Y12 values <194 PRU (low end of reference range) are specific evidence of a P2Y12 inhibitor effect.  Patients who have been treated with Glycoprotein IIb/IIIa inhibitors should not be tested until platelet function has recovered. This time period is approximately 14 days after discontinuation of abciximab (ReoPro) and up to 48 hours after discontinuation of eptifibatide (Integrilin) and tirofiban (Aggrastat).   The P2Y12 test results should be interpreted in conjunction with other clinical and lab data available to the clinician.    C-reactive Protein [[id number]]  (Abnormal) Collected:  08/17/20 0449    Specimen:  Blood Updated:  08/17/20 0632     C-Reactive Protein 7.14 mg/dL     Lipid Panel [[id number]] Collected:  08/17/20 0449    Specimen:  Blood Updated:  08/17/20 0632     Total Cholesterol 145 mg/dL      Triglycerides 100 mg/dL      HDL Cholesterol 46 mg/dL      LDL Cholesterol  79 mg/dL      VLDL Cholesterol 20 mg/dL      LDL/HDL Ratio 1.72    Narrative:       Cholesterol Reference Ranges  (U.S. Department of Health and Human Services ATP III Classifications)    Desirable          <200 mg/dL  Borderline High    200-239 mg/dL  High Risk          >240 mg/dL      Triglyceride Reference Ranges  (U.S. Department of Health  and Human Services ATP III Classifications)    Normal           <150 mg/dL  Borderline High  150-199 mg/dL  High             200-499 mg/dL  Very High        >500 mg/dL    HDL Reference Ranges  (U.S. Department of Health and Human Services ATP III Classifcations)    Low     <40 mg/dl (major risk factor for CHD)  High    >60 mg/dl ('negative' risk factor for CHD)        LDL Reference Ranges  (U.S. Department of Health and Human Services ATP III Classifcations)    Optimal          <100 mg/dL  Near Optimal     100-129 mg/dL  Borderline High  130-159 mg/dL  High             160-189 mg/dL  Very High        >189 mg/dL    Hemoglobin A1c [[id number]]  (Normal) Collected:  08/17/20 0449    Specimen:  Blood Updated:  08/17/20 0535     Hemoglobin A1C 5.10 %     Narrative:       Hemoglobin A1C Ranges:    Increased Risk for Diabetes  5.7% to 6.4%  Diabetes                     >= 6.5%  Diabetic Goal                < 7.0%    Sedimentation Rate [[id number]]  (Normal) Collected:  08/16/20 2131    Specimen:  Blood Updated:  08/17/20 0458     Sed Rate 27 mm/hr     COVID PRE-OP / PRE-PROCEDURE SCREENING ORDER (NO ISOLATION) - Swab, Nasopharynx [[id number]] Collected:  08/17/20 0051    Specimen:  Swab from Nasopharynx Updated:  08/17/20 0306    Narrative:       The following orders were created for panel order COVID PRE-OP / PRE-PROCEDURE SCREENING ORDER (NO ISOLATION) - Swab, Nasopharynx.  Procedure                               Abnormality         Status                     ---------                               -----------         ------                     Respiratory Panel PCR w/...[[id number]]  Normal              Final result                 Please view results for these tests on the individual orders.    Respiratory Panel PCR w/COVID-19(SARS-CoV-2) OLGA/HERMAN/RICHARD/PAD In-House, NP Swab in UTM/VTM, 3-4 HR TAT - Swab, Nasopharynx [[id number]]  (Normal) Collected:  08/17/20 0051    Specimen:  Swab from Nasopharynx Updated:  08/17/20 0306      ADENOVIRUS, PCR Not Detected     Coronavirus 229E Not Detected     Coronavirus HKU1 Not Detected     Coronavirus NL63 Not Detected     Coronavirus OC43 Not Detected     COVID19 Not Detected     Human Metapneumovirus Not Detected     Human Rhinovirus/Enterovirus Not Detected     Influenza A PCR Not Detected     Influenza A H1 Not Detected     Influenza A H1 2009 PCR Not Detected     Influenza A H3 Not Detected     Influenza B PCR Not Detected     Parainfluenza Virus 1 Not Detected     Parainfluenza Virus 2 Not Detected     Parainfluenza Virus 3 Not Detected     Parainfluenza Virus 4 Not Detected     RSV, PCR Not Detected     Bordetella pertussis pcr Not Detected     Bordetella parapertussis PCR Not Detected     Chlamydophila pneumoniae PCR Not Detected     Mycoplasma pneumo by PCR Not Detected    Narrative:       Fact sheet for providers: https://docs.[employer]/wp-content/uploads/XKJ8205-0982-WG9.1-EUA-Provider-Fact-Sheet-3.pdf    Fact sheet for patients: https://docs.[employer]/wp-content/uploads/UUV5664-1348-AU3.1-EUA-Patient-Fact-Sheet-1.pdf    Urinalysis With Culture If Indicated - Urine, Clean Catch [[id number]]  (Abnormal) Collected:  08/17/20 0052    Specimen:  Urine, Clean Catch Updated:  08/17/20 0140     Color, UA Yellow     Appearance, UA Clear     pH, UA 6.5     Specific Gravity, UA 1.049     Glucose, UA Negative     Ketones, UA Trace     Bilirubin, UA Negative     Blood, UA Negative     Protein, UA Negative     Leuk Esterase, UA Negative     Nitrite, UA Negative     Urobilinogen, UA 0.2 E.U./dL    Narrative:       Urine microscopic not indicated.    POC Glucose Once [[id number]]  (Normal) Collected:  08/17/20 0046    Specimen:  Blood Updated:  08/17/20 0056     Glucose 103 mg/dL     POC Glucose Once [[id number]]  (Normal) Collected:  08/16/20 2133    Specimen:  Blood Updated:  08/16/20 2228     Glucose 114 mg/dL     Procalcitonin [[id number]]  (Abnormal) Collected:  08/16/20 2131    Specimen:  Blood  "Updated:  08/16/20 2153     Procalcitonin 3.24 ng/mL     Narrative:       As a Marker for Sepsis (Non-Neonates):   1. <0.5 ng/mL represents a low risk of severe sepsis and/or septic shock.  1. >2 ng/mL represents a high risk of severe sepsis and/or septic shock.    As a Marker for Lower Respiratory Tract Infections that require antibiotic therapy:  PCT on Admission     Antibiotic Therapy             6-12 Hrs later  > 0.5                Strongly Recommended            >0.25 - <0.5         Recommended  0.1 - 0.25           Discouraged                   Remeasure/reassess PCT  <0.1                 Strongly Discouraged          Remeasure/reassess PCT      As 28 day mortality risk marker: \"Change in Procalcitonin Result\" (> 80 % or <=80 %) if Day 0 (or Day 1) and Day 4 values are available. Refer to http://www.[employer]-calculator.com/   Change in PCT <=80 %   A decrease of PCT levels below or equal to 80 % defines a positive change in PCT test result representing a higher risk for 28-day all-cause mortality of patients diagnosed with severe sepsis or septic shock.  Change in PCT > 80 %   A decrease of PCT levels of more than 80 % defines a negative change in PCT result representing a lower risk for 28-day all-cause mortality of patients diagnosed with severe sepsis or septic shock.                Results may be falsely decreased if patient taking Biotin.     Comprehensive Metabolic Panel [[id number]]  (Abnormal) Collected:  08/16/20 2131    Specimen:  Blood Updated:  08/16/20 2148     Glucose 106 mg/dL      BUN 18 mg/dL      Creatinine 0.58 mg/dL      Sodium 138 mmol/L      Potassium 3.8 mmol/L      Comment: Specimen hemolyzed.  Results may be affected.        Chloride 104 mmol/L      CO2 21.0 mmol/L      Calcium 9.8 mg/dL      Total Protein 6.7 g/dL      Albumin 3.80 g/dL      ALT (SGPT) 7 U/L      AST (SGOT) 14 U/L      Alkaline Phosphatase 236 U/L      Total Bilirubin 0.4 mg/dL      eGFR Non  Amer 101 " mL/min/1.73      Globulin 2.9 gm/dL      A/G Ratio 1.3 g/dL      BUN/Creatinine Ratio 31.0     Anion Gap 13.0 mmol/L     Narrative:       GFR Normal >60  Chronic Kidney Disease <60  Kidney Failure <15      Magnesium [[id number]]  (Normal) Collected:  08/16/20 2131    Specimen:  Blood Updated:  08/16/20 2148     Magnesium 1.9 mg/dL     Troponin [[id number]]  (Normal) Collected:  08/16/20 2131    Specimen:  Blood Updated:  08/16/20 2146     Troponin T <0.010 ng/mL     Narrative:       Troponin T Reference Range:  <= 0.03 ng/mL-   Negative for AMI  >0.03 ng/mL-     Abnormal for myocardial necrosis.  Clinicians would have to utilize clinical acumen, EKG, Troponin and serial changes to determine if it is an Acute Myocardial Infarction or myocardial injury due to an underlying chronic condition.       Results may be falsely decreased if patient taking Biotin.      proBNP [[id number]]  (Abnormal) Collected:  08/16/20 2131    Specimen:  Blood Updated:  08/16/20 2146     proBNP 2,005.0 pg/mL     Narrative:       Among patients with dyspnea, NT-proBNP is highly sensitive for the detection of acute congestive heart failure. In addition NT-proBNP of <300 pg/ml effectively rules out acute congestive heart failure with 99% negative predictive value.    Results may be falsely decreased if patient taking Biotin.      Protime-INR [[id number]]  (Normal) Collected:  08/16/20 2130    Specimen:  Blood Updated:  08/16/20 2142     Protime 12.8 Seconds      INR 0.99    CBC & Differential [[id number]] Collected:  08/16/20 2131    Specimen:  Blood Updated:  08/16/20 2138    Narrative:       The following orders were created for panel order CBC & Differential.  Procedure                               Abnormality         Status                     ---------                               -----------         ------                     CBC Auto Differential[[id number]]        Abnormal            Final result                 Please view results for  these tests on the individual orders.    CBC Auto Differential [[id number]]  (Abnormal) Collected:  08/16/20 2131    Specimen:  Blood Updated:  08/16/20 2138     WBC 13.12 10*3/mm3      RBC 3.81 10*6/mm3      Hemoglobin 11.8 g/dL      Hematocrit 36.9 %      MCV 96.9 fL      MCH 31.0 pg      MCHC 32.0 g/dL      RDW 17.0 %      RDW-SD 60.2 fl      MPV 8.9 fL      Platelets 459 10*3/mm3      Neutrophil % 78.5 %      Lymphocyte % 11.7 %      Monocyte % 8.5 %      Eosinophil % 0.3 %      Basophil % 0.5 %      Immature Grans % 0.5 %      Neutrophils, Absolute 10.31 10*3/mm3      Lymphocytes, Absolute 1.54 10*3/mm3      Monocytes, Absolute 1.11 10*3/mm3      Eosinophils, Absolute 0.04 10*3/mm3      Basophils, Absolute 0.06 10*3/mm3      Immature Grans, Absolute 0.06 10*3/mm3      nRBC 0.0 /100 WBC         Ct Angiogram Neck    Result Date: 8/16/2020  1. There is somewhat irregular calcified plaque at the right carotid bifurcation with about 50% diameter stenosis by NASCET criteria. 2. There is been a previous left-sided carotid endarterectomy. There is less than 10% diameter stenosis at the distal end of the endarterectomy. 3. Both vertebral arteries are patent and codominant. Likely mild to moderate stenosis near their origins due to calcified plaque. Both supply the basilar. 4. There is no intracranial vascular cut off or focal central stenosis. There is an irregular appearance to the more distal left posterior cerebral artery distribution and more distal right MCA territory, probably due to intracranial atherosclerotic disease with vasculitis as the main differential consideration. 5. Likely tiny infundibuli origins of tiny posterior communicating arteries bilaterally. 5. Great vessel origins from the arch are not well included in the field-of-view. Likely some narrowing at the proximal right and left common carotid arteries. Signer Name: Alexandra Alfaro MD  Signed: 8/16/2020 10:09 PM  Workstation Name: [facility]  [facility]    Ct Lumbar Spine Without Contrast    Result Date: 8/17/2020  T12 compression deformity, strictly age-indeterminate with minimal bony retropulsion not producing significant associated spinal canal narrowing. Additional vertebral body superior endplate deformity at L4 likely related to adjacent discogenic degenerative changes. No definite CT bony findings to suggest underlying metastatic disease, however CT is insensitive and MRI is recommended if there is further concern for spinal metastatic disease.  Superimposed lumbar spondylosis with likely moderate to severe spinal canal narrowing and neuroforaminal stenosis at L4-L5.   This report was finalized on 8/17/2020 12:04 PM by Hi Tomas.      Mri Brain Without Contrast    Result Date: 8/17/2020  Focal area of restricted diffusion involving the right hemipons concerning for acute infarct. No associated significant mass effect or hemorrhage.  Superimposed moderate periventricular white matter changes which are likely related to chronic small vessel ischemic change.   This report was finalized on 8/17/2020 7:56 AM by Hi Tomas.      Fl Video Swallow With Speech Single Contrast    Result Date: 8/17/2020  Fluoroscopy provided for a modified barium swallow. Please see speech therapy report for full details and recommendations.        Xr Chest 1 View    Result Date: 8/16/2020  No acute cardiopulmonary findings. Signer Name: Rishi Pride MD  Signed: 8/16/2020 10:02 PM  Workstation Name: [email or web address]  [facility]    Ct Angiogram Head    Result Date: 8/16/2020  1. There is somewhat irregular calcified plaque at the right carotid bifurcation with about 50% diameter stenosis by NASCET criteria. 2. There is been a previous left-sided carotid endarterectomy. There is less than 10% diameter stenosis at the distal end of the endarterectomy. 3. Both vertebral arteries are patent and codominant. Likely mild to moderate  stenosis near their origins due to calcified plaque. Both supply the basilar. 4. There is no intracranial vascular cut off or focal central stenosis. There is an irregular appearance to the more distal left posterior cerebral artery distribution and more distal right MCA territory, probably due to intracranial atherosclerotic disease with vasculitis as the main differential consideration. 5. Likely tiny infundibuli origins of tiny posterior communicating arteries bilaterally. 5. Great vessel origins from the arch are not well included in the field-of-view. Likely some narrowing at the proximal right and left common carotid arteries. Signer Name: Alexandra Alfaro MD  Signed: 8/16/2020 10:09 PM  Workstation Name: [email or web address]  [facility]    Ct Cerebral Perfusion With & Without Contrast    Result Date: 8/16/2020  Essentially normal CT brain perfusion. Signer Name: Alexandra Alfaro MD  Signed: 8/16/2020 9:35 PM  Workstation Name: [email or web address]  [facility]               Assessment/Plan     Assessment/Plan:  75-year-old female who has a poor functional status at baseline from a previous stroke 1 year ago and significant vascular risk factors takes only Plavix and now presented with left facial droop, difficulty swallowing, left arm and left leg weakness.            Diagnostics- I personally reviewed all the imaging and labs  -CT head-mild atrophy  -CT head and neck, codominant vertebral arteries, leftward calcification at the origin, right V4 stenosis short segment, left V3 V4 calcification.  Left carotid complex heterogeneous plaque.  Right carotid plaque, right ICA complex heterogeneous plaque.  -MRI brain-  right subacute pontine infarct, questionable right cerebellar infarct.  Subcortical white spread T2 flair hyperintensities suggestive small vessel ischemia .  -Transthoracic echo-  -carotid ultrasound pending  -LDL 79, A1c of 5.1  -P2 Y2- -82     #1  a. Acute to subacute ischemic  stroke, right pontine, right cerebellar likely etiology, large vessel atherosclerosis  Bilateral vert calcification could be the source,as an artery to artery emboli causing pontine and cerebellar stroke.  Patient has significant carotid atherosclerotic disease.  Since her MRS 4 from a previous stroke, she is not a candidate for intervention.  I recommend to  repeat CTA in 3 months.          b. Stroke secondary prevention-aspirin and Plavix          c. Stroke recovery- Activity, PT/OT/Speech-can increase activity, may be a rehab             candidate                       #2 Hypertension-normalize blood pressure goals  #3  COPD continue home regimen  #4 Hyperlipidemia- goal less than < 70.  Lipitor 40        This is a moderate complex patient. The test results were discussed with the patient/ family and with admitting/primary physician taking care in the hospital and recommended further diagnostics and management plans in a collaborative fashion.           Silverio Thakur MD  08/17/20  14:51

## 2020-08-17 NOTE — NURSING NOTE
Daily low Adolfo check. Patient's Adolfo today is 14. Spoke with DAVID Velasquez-no skin concerns. Patient is bed-bound. [place] bed without low air loss topper ordered via [facility] ([phone]). All interventions in place and documented per protocol. No concerns noted per staff. Please notify WOC for any questions.

## 2020-08-17 NOTE — PROGRESS NOTES
Discharge Planning Assessment  [facility]     Patient Name: Morelia Madden  MRN: [id number]  Today's Date: 8/17/2020    Admit Date: 8/16/2020    Discharge Needs Assessment     Row Name 08/17/20 1059       Living Environment    Lives With  grandchild(moreno);child(moreno), adult    Current Living Arrangements  home/apartment/condo    Living Arrangement Comments  Lives with her daughter and granddaughter (14yr old). The pt is mostly bed bound as she is not interested in walking. The daughter Gabby assists the pt to the [facility] as needed. The daughter in law Zoe states Gabby cares for her mother but has some mental deficits herself.  Gabby assists with all care needs,cooking and cleaning..       Discharge Needs Assessment    Equipment Currently Used at Home  bath bench;hospital bed;walker, rolling;commode    Equipment Needed After Discharge  none    Discharge Coordination/Progress  The pt went to rehab after a previous CVA in [place] but refused to stay and went home after one day. She has had HH in the past but they DCed her due to noncompliance. The family is unsure of the name of the HH agency.        Discharge Plan     Row Name 08/17/20 1104       Plan    Plan  Home with HH vs rehab    Patient/Family in Agreement with Plan  yes    Plan Comments  I spoke with the pts daughter in law Zoe.  Zoe is unsure of the DC needs at this time. The pt has not be cooperative with HH and rehab in the past. CM will follow.        Destination      Coordination has not been started for this encounter.      Durable Medical Equipment      Coordination has not been started for this encounter.      Dialysis/Infusion      Coordination has not been started for this encounter.      Home Medical Care      Coordination has not been started for this encounter.      Therapy      Coordination has not been started for this encounter.      Community Resources      Coordination has not been started for this encounter.        Expected  Discharge Date and Time     Expected Discharge Date Expected Discharge Time    Aug 20, 2020         Demographic Summary    No documentation.       Functional Status     Row Name 08/17/20 1059       Functional Status    Usual Activity Tolerance  poor       Functional Status, IADL    Medications  assistive person    Meal Preparation  completely dependent    Housekeeping  completely dependent    Laundry  completely dependent    Shopping  completely dependent        Psychosocial    No documentation.       Abuse/Neglect    No documentation.       Legal    No documentation.       Substance Abuse    No documentation.       Patient Forms    No documentation.           Gunjan Goodwin RN

## 2020-08-17 NOTE — CONSULTS
Stroke Consult Note    Patient Name: Morelia Madden   MRN: [id number]  Age: 75 y.o.  Sex: female  : 1945    Primary Care Physician: Samantha Oseguera MD  Referring Physician:  Provider, No Known    TIME STROKE TEAM CALLED:  EST     TIME PATIENT SEEN: 2120 EST    Handedness: Right  Race:     Chief Complaint/Reason for Consultation: Dysarthria, expressive aphasia    Subjective .  HPI: Morelia Madden is a 75 yr old female with a PMH significant for CVA vs TIA 1 yr ago, HTN, HLD, COPD, tobacco abuse, chronic back/leg pain,  and hypothyroidism that presents to [facility] via transfer from [facility] with c/o of dysarthria, expressive aphasia, left facial droop, and difficulty swallowing. Her last known well was 8/15/2020 2100. She lives with her daughter and granddaughter. Per report, her daughter is mentally disabled but able to help the patient with ADLs. The patient walks with a walker at baseline.The patient's son went to visit her today and noted her difficulty with speech and took her to the OSH for evaluation. the daughter states the patient has been unable to swallow since last night. NIH 8 @ OSH for left facial droop, expressive aphasia, dysarthria, and inability to tell the month or her age. CTH was negative for any acute process. She was transferred to [facility] for further work up.     On arrival to [facility], she is noted to have gaze palsy to the right, left facial droop, severe dysarthria, sensory loss, drift in her BLE, and mild aphasia. NIH 9. /101 on arrival. CTA and CTP obtained.    Last Known Normal Date/Time: 8/15/2020 2100EST     Review of Systems   Constitutional: Positive for activity change and appetite change.   HENT: Negative.    Eyes: Negative.    Respiratory: Negative.    Cardiovascular: Negative.    Endocrine: Negative.    Genitourinary: Negative.    Musculoskeletal: Positive for arthralgias.   Allergic/Immunologic: Negative.    Neurological: Positive for facial  asymmetry, speech difficulty and weakness.   Hematological: Negative.    Psychiatric/Behavioral: Positive for confusion.      No past medical history on file.  No past surgical history on file.  No family history on file.  Social History     Socioeconomic History   • Marital status:      Spouse name: Not on file   • Number of children: Not on file   • Years of education: Not on file   • Highest education level: Not on file     No Known Allergies  Prior to Admission medications    Not on File             Objective     Temp:  [98 °F (36.7 °C)] 98 °F (36.7 °C)  Heart Rate:  [109-110] 109  Resp:  [16] 16  BP: (191-204)/(101-103) 204/103  Neurological Exam  Mental Status  Awake. Oriented only to person, place and time. Severe dysarthria present. Expressive aphasia present.    Cranial Nerves  CN II: Vision test: Partial gaze palsy to the right. Partial gaze palsy to the right.  CN III, IV, VI: Normal lids and orbits bilaterally. Pupils equal round and reactive to light bilaterally.  CN V:  Left: Diminished sensation of the entire left side of the face.  CN VII:  Left: Left facial droop.  CN VIII: Hearing is normal.  CN IX, X: Palate elevates symmetrically  Right: Gag is intact.  Unable to assess XI and XII. Inconsistency with following commands. .    Motor    OKEEFE. Strength 5/5. Mild drift in BLE. .    Sensory  Light touch abnormality: Left face.     Reflexes                                           Right                      Left  Plantar                           Downgoing                Downgoing    Coordination  Right: Heel-to-shin normal.  Left: Heel-to-shin normal.    Gait  Not assessed.      Physical Exam   Constitutional: She appears listless. She appears cachectic.   HENT:   Head: Normocephalic and atraumatic.   Eyes: Pupils are equal, round, and reactive to light. Conjunctivae and lids are normal.   Partial gaze palsy to the right   Neck: Normal range of motion.   Cardiovascular: Normal rate and regular  rhythm.   Pulmonary/Chest: Effort normal and breath sounds normal.   Abdominal: Soft. Bowel sounds are normal.   Musculoskeletal:   As above   Neurological: She appears listless.   As above   Skin: Skin is warm and dry.       Acute Stroke Data    Alteplase (tPA) Inclusion / Exclusion Criteria    Time: 22:10  Person Administering Scale: JANICE Craig    Inclusion Criteria  [x]   18 years of age or greater   []   Onset of symptoms < 4.5 hours before beginning treatment (stroke onset = time patient was last seen well or without symptoms).   []   Diagnosis of acute ischemic stroke causing measurable disabling deficit (Complete Hemianopia, Any Aphasia, Visual or Sensory Extinction, Any weakness limiting sustained effort against gravity)   []   Any remaining deficit considered potentially disabling in view of patient and practitioner   Exclusion criteria (Do not proceed with Alteplase if any are checked under exclusion criteria)  [x]   Onset unknown or GREATER than 4.5 hours   []   ICH on CT/MRI   []   CT demonstrates hypodensity representing acute or subacute infarct   []   Significant head trauma or prior stroke in the previous 3 months   []   Symptoms suggestive of subarachnoid hemorrhage   []   History of un-ruptured intracranial aneurysm GREATER than 10 mm   []   Recent intracranial or intraspinal surgery within the last 3 months   []   Arterial puncture at a non-compressible site in the previous 7 days   []   Active internal bleeding   []   Acute bleeding tendency   []   Platelet count LESS than 100,000 for known hematological diseases such as leukemia, thrombocytopenia or chronic cirrhosis   []   Current use of anticoagulant with INR GREATER than 1.7 or PT GREATER than 15 seconds, aPTT GREATER than 40 seconds   []   Heparin received within 48 hours, resulting in abnormally elevated aPTT GREATER than upper limit of normal   []   Current use of direct thrombin inhibitors or direct factor Xa inhibitors in  the past 48 hours   []   Elevated blood pressure refractory to treatment (systolic GREATER than 185 mm/Hg or diastolic  GREATER than 110 mm/Hg   []   Suspected infective endocarditis and aortic arch dissection   []   Current use of therapeutic treatment dose of low-molecular-weight heparin (LMWH) within the previous 24 hours   []   Structural GI malignancy or bleed   Relative exclusion for all patients  []   Only minor non-disabling symptoms   []   Pregnancy   []   Seizure at onset with postictal residual neurological impairments   []   Major surgery or previous trauma within past 14 days   []   History of previous spontaneous ICH, intracranial neoplasm, or AV malformation   []   Postpartum (within previous 14 days)   []   Recent GI or urinary tract hemorrhage (within previous 21 days)   []   Recent acute MI (within previous 3 months)   []   History of un-ruptured intracranial aneurysm LESS than 10 mm   []   History of ruptured intracranial aneurysm   []   Blood glucose LESS than 50 mg/dL (2.7 mmol/L)   []   Dural puncture within the last 7 days   []   Known GREATER than 10 cerebral microbleeds   Additional exclusions for patients with symptoms onset between 3 and 4.5 hours.  []   Age > 80.   []   On any anticoagulants regardless of INR  >>> Warfarin (Coumadin), Heparin, Enoxaparin (Lovenox), fondaparinux (Arixtra), bivalirudin (Angiomax), Argatroban, dabigatran (Pradaxa), rivaroxaban (Xarelto), or apixaban (Eliquis)   []   Severe stroke (NIHSS > 25).   []   History of BOTH diabetes and previous ischemic stroke.   []   The risks and benefits have been discussed with the patient or family related to the administration of IV Alteplase for stroke symptoms.   []   I have discussed and reviewed the patient's case and imaging with the attending prior to IV Alteplase.    Time Alteplase administered       Hospital Meds:  Scheduled-   [START ON 8/17/2020] aspirin 81 mg Oral Daily   Or      [START ON 8/17/2020] aspirin 300 mg  Rectal Daily   atorvastatin 80 mg Oral Nightly   clopidogrel 75 mg Oral Daily   [START ON 2020] pantoprazole 40 mg Intravenous Q AM   sodium chloride 10 mL Intravenous Q12H   sodium chloride 10 mL Intravenous Q12H     Infusions-     PRNs- •  acetaminophen **OR** acetaminophen **OR** acetaminophen  •  docusate sodium  •  metoprolol tartrate  •  ondansetron **OR** ondansetron  •  sodium chloride  •  sodium chloride    Functional Status Prior to Current Stroke/Eitan Score: 3    NIH Stroke Scale  Time: 22:10  Person Administering Scale: JANICE Craig    1a  Level of consciousness: 0=alert; keenly responsive   1b. LOC questions:  0=Performs both tasks correctly   1c. LOC commands: 0=Performs both tasks correctly   2.  Best Gaze: 1=partial gaze palsy   3.  Visual: 0=No visual loss   4. Facial Palsy: 2=Partial paralysis (total or near total paralysis of the lower face)   5a.  Motor left arm: 0=No drift, limb holds 90 (or 45) degrees for full 10 seconds   5b.  Motor right arm: 0=No drift, limb holds 90 (or 45) degrees for full 10 seconds   6a. motor left le=Drift, limb holds 90 (or 45) degrees but drifts down before full 10 seconds: does not hit bed   6b  Motor right le=Drift, limb holds 90 (or 45) degrees but drifts down before full 10 seconds: does not hit bed   7. Limb Ataxia: 0=Absent   8.  Sensory: 1=Mild to moderate sensory loss; patient feels pinprick is less sharp or is dull on the affected side; there is a loss of superficial pain with pinprick but patient is aware She is being touched   9. Best Language:  1=Mild to moderate aphasia; some obvious loss of fluency or facility of comprehension without significant limitation on ideas expressed or form of expression.   10. Dysarthria: 2=Severe; patient speech is so slurred as to be unintelligible in the absence of or our of proportion to any dysphagia, or is mute/anarthric   11. Extinction and Inattention: 0=No abnormality    Total:   9        Results Reviewed:  I have personally reviewed current lab, radiology, and data and agree with results.  Ct Angiogram Neck    Result Date: 8/16/2020  1. There is somewhat irregular calcified plaque at the right carotid bifurcation with about 50% diameter stenosis by NASCET criteria. 2. There is been a previous left-sided carotid endarterectomy. There is less than 10% diameter stenosis at the distal end of the endarterectomy. 3. Both vertebral arteries are patent and codominant. Likely mild to moderate stenosis near their origins due to calcified plaque. Both supply the basilar. 4. There is no intracranial vascular cut off or focal central stenosis. There is an irregular appearance to the more distal left posterior cerebral artery distribution and more distal right MCA territory, probably due to intracranial atherosclerotic disease with vasculitis as the main differential consideration. 5. Likely tiny infundibuli origins of tiny posterior communicating arteries bilaterally. 5. Great vessel origins from the arch are not well included in the field-of-view. Likely some narrowing at the proximal right and left common carotid arteries. Signer Name: Alexandra Alfaro MD  Signed: 8/16/2020 10:09 PM  Workstation Name: [email or web address]  [facility]    Xr Chest 1 View    Result Date: 8/16/2020  No acute cardiopulmonary findings. Signer Name: Rishi Pride MD  Signed: 8/16/2020 10:02 PM  [facility]: [email or web address]    Ct Angiogram Head    Result Date: 8/16/2020  1. There is somewhat irregular calcified plaque at the right carotid bifurcation with about 50% diameter stenosis by NASCET criteria. 2. There is been a previous left-sided carotid endarterectomy. There is less than 10% diameter stenosis at the distal end of the endarterectomy. 3. Both vertebral arteries are patent and codominant. Likely mild to moderate stenosis near their origins due to calcified plaque. Both supply  the basilar. 4. There is no intracranial vascular cut off or focal central stenosis. There is an irregular appearance to the more distal left posterior cerebral artery distribution and more distal right MCA territory, probably due to intracranial atherosclerotic disease with vasculitis as the main differential consideration. 5. Likely tiny infundibuli origins of tiny posterior communicating arteries bilaterally. 5. Great vessel origins from the arch are not well included in the field-of-view. Likely some narrowing at the proximal right and left common carotid arteries. Signer Name: Alexandra Alfaro MD  Signed: 8/16/2020 10:09 PM  [email or web address]  [facility]    Ct Cerebral Perfusion With & Without Contrast    Result Date: 8/16/2020  Essentially normal CT brain perfusion. Signer Name: Alexandra Alfaro MD  Signed: 8/16/2020 9:35 PM  [facility]station [email or web address]  [facility]                 Assessment/Plan:      Morelia Madden is a 75 yr old female with a PMH significant for CVA vs TIA 1 yr ago, HTN, HLD, COPD, tobacco abuse, chronic back/leg pain,  and hypothyroidism that presents to [facility] via transfer from [facility] with c/o of dysarthria, expressive aphasia, left facial droop, and difficulty swallowing. Her last known well was 8/15/2020 2100. She lives with her daughter and granddaughter. Per report, her daughter is mentally disabled but able to help the patient with ADLs. The patient walks with a walker at baseline.The patient's son went to visit her today and noted her difficulty with speech and took her to the OSH for evaluation. the daughter states the patient has been unable to swallow since last night. NIH 8 @ OSH for left facial droop, expressive aphasia, dysarthria, and inability to tell the month or her age. CTH was negative for any acute process. She was transferred to [facility] for further work up. On arrival to [facility], she is noted to have gaze palsy to the  right, left facial droop, severe dysarthria, sensory loss, drift in her BLE, and mild aphasia. NIH 9. /101 on arrival. CTA and CTP obtained. CTP negative. CTA with no intracranial vascular cut off or focal central stenosis. Irregular appearance to the more distal left posterior cerebral artery distribution and more distal right MCA territory, likely due to intracranial atherosclerotic disease.     Dysarthria, facial droop, aphasia, partial gaze palsy to the right  -Possible Stroke    -MRI pending  -TIA/Ischemic Stroke without thrombolytic therapy order set  -ASA  -Plavix  -High dose statin  -Echo  -Carotid Duplex  -FLP in am  -HgbA1c in am  -NPO pending swallow evaluation  -Fall precautions  -PT/OT/SLP  -Chronic pain; management per hospital medicine  -Elevated procalcitonin, mild leukocytosis 13.12 followed by hospital medicine      JANICE Craig, AGACNP-BC, Stroke Navigator  August 16, 2020  10:10 PM

## 2020-08-17 NOTE — PROGRESS NOTES
[facility] Medicine Services  PROGRESS NOTE    Patient Name: Morelia Madden  : 1945  MRN: [id number]    Date of Admission: 2020  Primary Care Physician: Samantha Oseguera MD    Subjective   Subjective     CC: Follow-up dysarthria    HPI:No acute events overnight, patient remains dysarthric    Review of Systems  Unable to obtain complete ROS secondary to dysarthria    Objective   Objective     Vital Signs:   Temp:  [97.8 °F (36.6 °C)-98.2 °F (36.8 °C)] 98.2 °F (36.8 °C)  Heart Rate:  [106-114] 106  Resp:  [16-18] 16  BP: (156-204)/() 156/78  Total (NIH Stroke Scale): 9     Physical Exam:  Constitutional: Chronically ill-appearing elderly lady no acute distress, awake, alert  HENT: NCAT, mucous membranes moist  Respiratory: Clear to auscultation bilaterally, respiratory effort normal   Cardiovascular: RRR, no murmurs, rubs, or gallops, palpable pedal pulses bilaterally  Gastrointestinal: Positive bowel sounds, soft, nontender, nondistended  Musculoskeletal: No bilateral ankle edema  Psychiatric: Appropriate affect, cooperative  Neurologic: Oriented x 3, dysarthria, right facial droop, right-sided weakness  Skin: No rashes    Results Reviewed:  Results from last 7 days   Lab Units 20   WBC 10*3/mm3 13.12*  --    HEMOGLOBIN g/dL 11.8*  --    HEMATOCRIT % 36.9  --    PLATELETS 10*3/mm3 459*  --    INR   --  0.99   PROCALCITONIN ng/mL 3.24*  --      Results from last 7 days   Lab Units 20  2131   SODIUM mmol/L 138   POTASSIUM mmol/L 3.8   CHLORIDE mmol/L 104   CO2 mmol/L 21.0*   BUN mg/dL 18   CREATININE mg/dL 0.58   GLUCOSE mg/dL 106*   CALCIUM mg/dL 9.8   ALT (SGPT) U/L 7   AST (SGOT) U/L 14   TROPONIN T ng/mL <0.010   PROBNP pg/mL 2,005.0*     Estimated Creatinine Clearance: 34.9 mL/min (by C-G formula based on SCr of 0.58 mg/dL).    Microbiology Results Abnormal     Procedure Component Value - Date/Time    COVID PRE-OP /  PRE-PROCEDURE SCREENING ORDER (NO ISOLATION) - Swab, Nasopharynx [[id number]] Collected:  08/17/20 0051    Lab Status:  Final result Specimen:  Swab from Nasopharynx Updated:  08/17/20 0306    Narrative:       The following orders were created for panel order COVID PRE-OP / PRE-PROCEDURE SCREENING ORDER (NO ISOLATION) - Swab, Nasopharynx.  Procedure                               Abnormality         Status                     ---------                               -----------         ------                     Respiratory Panel PCR w/...[[id number]]  Normal              Final result                 Please view results for these tests on the individual orders.    Respiratory Panel PCR w/COVID-19(SARS-CoV-2) OLGA/HERMAN/RICHARD/PAD In-House, NP Swab in UTM/VTM, 3-4 HR TAT - Swab, Nasopharynx [[id number]]  (Normal) Collected:  08/17/20 0051    Lab Status:  Final result Specimen:  Swab from Nasopharynx Updated:  08/17/20 0306     ADENOVIRUS, PCR Not Detected     Coronavirus 229E Not Detected     Coronavirus HKU1 Not Detected     Coronavirus NL63 Not Detected     Coronavirus OC43 Not Detected     COVID19 Not Detected     Human Metapneumovirus Not Detected     Human Rhinovirus/Enterovirus Not Detected     Influenza A PCR Not Detected     Influenza A H1 Not Detected     Influenza A H1 2009 PCR Not Detected     Influenza A H3 Not Detected     Influenza B PCR Not Detected     Parainfluenza Virus 1 Not Detected     Parainfluenza Virus 2 Not Detected     Parainfluenza Virus 3 Not Detected     Parainfluenza Virus 4 Not Detected     RSV, PCR Not Detected     Bordetella pertussis pcr Not Detected     Bordetella parapertussis PCR Not Detected     Chlamydophila pneumoniae PCR Not Detected     Mycoplasma pneumo by PCR Not Detected    Narrative:       Fact sheet for providers: https://docs.Queryly/wp-content/uploads/SYB7914-0003-XR6.1-EUA-Provider-Fact-Sheet-3.pdf    Fact sheet for patients:  https://docs.Runner/wp-content/uploads/NDW6215-6691-EE3.1-EUA-Patient-Fact-Sheet-1.pdf          Imaging Results (Last 24 Hours)     Procedure Component Value Units Date/Time    MRI Brain Without Contrast [[id number]] Collected:  08/17/20 0752     Updated:  08/17/20 0759    Narrative:       EXAMINATION: MRI BRAIN WO CONTRAST-      INDICATION: Stroke     TECHNIQUE: Multiplanar multisequence MRI of the brain without contrast     COMPARISON: CT 1 day prior     FINDINGS: There is a focal area of moderately restricted diffusion  involving the right hemipons concerning for area of acute infarct. The  significant associated edema, mass effect or evidence hemorrhage. There  are otherwise moderate periventricular white matter changes of chronic  small vessel ischemia. Ventricular size and configuration is normal  accounting for some degree of volume loss. The orbits are normal and the  paranasal sinuses are grossly clear. No additional mass or mass effect.  The craniocervical junction is unremarkable in the midline structures  are normal in appearance.       Impression:       Focal area of restricted diffusion involving the right  hemipons concerning for acute infarct. No associated significant mass  effect or hemorrhage.     Superimposed moderate periventricular white matter changes which are  likely related to chronic small vessel ischemic change.        This report was finalized on 8/17/2020 7:56 AM by Hi Tomas.       CT Lumbar Spine Without Contrast [[id number]] Resulted:  08/17/20 0622     Updated:  08/17/20 0628    CT Angiogram Head [[id number]] Collected:  08/16/20 2209     Updated:  08/16/20 2211    Narrative:       INDICATION:   Ectasia. Last seen well around 2100 last night    TECHNIQUE:   CT angiogram of the head and neck with contrast. 3-D reformatted images were acquired. Evaluation for a significant carotid arterial stenosis is based on the NASCET criteria. Radiation dose reduction techniques  included automated exposure control or  exposure modulation based on body size. Radiation audit for number of CT and nuclear cardiology exams performed in the last year:  1. Study performed during the intravenous administration of nonionic contrast media. The doses recorded in the patient's  chart. This also precontrast imaging.    COMPARISON:   CT perfusion study earlier today.    FINDINGS:   CTA neck:  There are vascular calcifications at the aortic arch. Imaging begins above the great vessel origins. There are vascular calcifications at least involving the great vessel origins. I cannot accurately estimate the degree of stenosis. There is  also vascular calcification involving the origin of the right subclavian artery.    The right carotid system shows mild narrowing at the origin of the right common carotid artery. There is partially calcified plaque at the right carotid bifurcation. By NASCET criteria there is about 50% diameter stenosis of the proximal right internal  carotid artery. There is calcified plaque at the right carotid siphon with likely mild stenosis.    There is likely at least mild stenosis proximal left common carotid artery. There are postoperative changes at the left carotid bifurcation consistent with previous endarterectomy. The distal left common carotid artery is capacious. There is mild  narrowing at the origin of the left external carotid artery. There is an irregular appearance to the proximal left internal carotid artery at the distal postoperative bed with mild, less than 10% diameter stenosis by NASCET criteria. There is calcified  plaque at the left carotid siphon with likely mild stenosis.    There is calcified plaque at the origin of the left vertebral artery and involving the proximal right vertebral artery. Resultant narrowing is mild to moderate bilaterally. The vessels are codominant and both supply the basilar.    CTA head:  There is no intracranial vascular cut off. If there  is an anterior communicating artery present it is tiny. There our likely tiny posterior communicating arteries bilaterally. There are tiny, 1 to 2 mm infundibuli at the origin of the  bilateral posterior communicators. Dural venous sinuses are patent. There is mild irregularity of the distal left posterior cerebral artery distribution. This could be a manifestation of intracranial atherosclerotic disease or vasculitis. There is also  mild irregularity of the more distal right MCA vessels again possibly due to intracranial atherosclerotic disease or vasculitis. No focal central stenosis is suspected. Study is performed with rapid technique for stroke evaluation and is therefore  limited for assessment for intracranial aneurysm.    There are degenerative changes in the cervical spine. The patient is edentulous.      Impression:         1. There is somewhat irregular calcified plaque at the right carotid bifurcation with about 50% diameter stenosis by NASCET criteria.  2. There is been a previous left-sided carotid endarterectomy. There is less than 10% diameter stenosis at the distal end of the endarterectomy.  3. Both vertebral arteries are patent and codominant. Likely mild to moderate stenosis near their origins due to calcified plaque. Both supply the basilar.  4. There is no intracranial vascular cut off or focal central stenosis. There is an irregular appearance to the more distal left posterior cerebral artery distribution and more distal right MCA territory, probably due to intracranial atherosclerotic  disease with vasculitis as the main differential consideration.  5. Likely tiny infundibuli origins of tiny posterior communicating arteries bilaterally.  5. Great vessel origins from the arch are not well included in the field-of-view. Likely some narrowing at the proximal right and left common carotid arteries.    Signer Name: Alexandra Alfaro MD   Signed: 8/16/2020 10:09 PM   Workstation Name: [email or web address]     [facility]    CT Angiogram Neck [[id number]] Collected:  08/16/20 2209     Updated:  08/16/20 2211    Narrative:       INDICATION:   Ectasia. Last seen well around 2100 last night    TECHNIQUE:   CT angiogram of the head and neck with contrast. 3-D reformatted images were acquired. Evaluation for a significant carotid arterial stenosis is based on the NASCET criteria. Radiation dose reduction techniques included automated exposure control or  exposure modulation based on body size. Radiation audit for number of CT and nuclear cardiology exams performed in the last year:  1. Study performed during the intravenous administration of nonionic contrast media. The doses recorded in the patient's  chart. This also precontrast imaging.    COMPARISON:   CT perfusion study earlier today.    FINDINGS:   CTA neck:  There are vascular calcifications at the aortic arch. Imaging begins above the great vessel origins. There are vascular calcifications at least involving the great vessel origins. I cannot accurately estimate the degree of stenosis. There is  also vascular calcification involving the origin of the right subclavian artery.    The right carotid system shows mild narrowing at the origin of the right common carotid artery. There is partially calcified plaque at the right carotid bifurcation. By NASCET criteria there is about 50% diameter stenosis of the proximal right internal  carotid artery. There is calcified plaque at the right carotid siphon with likely mild stenosis.    There is likely at least mild stenosis proximal left common carotid artery. There are postoperative changes at the left carotid bifurcation consistent with previous endarterectomy. The distal left common carotid artery is capacious. There is mild  narrowing at the origin of the left external carotid artery. There is an irregular appearance to the proximal left internal carotid artery at the distal postoperative bed with mild,  less than 10% diameter stenosis by NASCET criteria. There is calcified  plaque at the left carotid siphon with likely mild stenosis.    There is calcified plaque at the origin of the left vertebral artery and involving the proximal right vertebral artery. Resultant narrowing is mild to moderate bilaterally. The vessels are codominant and both supply the basilar.    CTA head:  There is no intracranial vascular cut off. If there is an anterior communicating artery present it is tiny. There our likely tiny posterior communicating arteries bilaterally. There are tiny, 1 to 2 mm infundibuli at the origin of the  bilateral posterior communicators. Dural venous sinuses are patent. There is mild irregularity of the distal left posterior cerebral artery distribution. This could be a manifestation of intracranial atherosclerotic disease or vasculitis. There is also  mild irregularity of the more distal right MCA vessels again possibly due to intracranial atherosclerotic disease or vasculitis. No focal central stenosis is suspected. Study is performed with rapid technique for stroke evaluation and is therefore  limited for assessment for intracranial aneurysm.    There are degenerative changes in the cervical spine. The patient is edentulous.      Impression:         1. There is somewhat irregular calcified plaque at the right carotid bifurcation with about 50% diameter stenosis by NASCET criteria.  2. There is been a previous left-sided carotid endarterectomy. There is less than 10% diameter stenosis at the distal end of the endarterectomy.  3. Both vertebral arteries are patent and codominant. Likely mild to moderate stenosis near their origins due to calcified plaque. Both supply the basilar.  4. There is no intracranial vascular cut off or focal central stenosis. There is an irregular appearance to the more distal left posterior cerebral artery distribution and more distal right MCA territory, probably due to intracranial  atherosclerotic  disease with vasculitis as the main differential consideration.  5. Likely tiny infundibuli origins of tiny posterior communicating arteries bilaterally.  5. Great vessel origins from the arch are not well included in the field-of-view. Likely some narrowing at the proximal right and left common carotid arteries.    Signer Name: Alexandra Alfaro MD   Signed: 8/16/2020 10:09 PM   Workstation Name: [facility]    [facility]    XR Chest 1 View [[id number]] Collected:  08/16/20 2202     Updated:  08/16/20 2205    Narrative:       CR Chest 1 Vw    INDICATION:   Stroke protocol chest x-ray.    Evaluate for aspiration pneumonia     COMPARISON:    None available.    FINDINGS:  Single portable AP view(s) of the chest.        The heart and mediastinal contours are normal. The lungs are clear. No pneumothorax or pleural effusion.       Impression:       No acute cardiopulmonary findings.    Signer Name: Rishi Pride MD   Signed: 8/16/2020 10:02 PM   Workstation Name: [facility]    [facility]    CT Cerebral Perfusion With & Without Contrast [[id number]] Collected:  08/16/20 2135     Updated:  08/16/20 2137    Narrative:       CT CEREBRAL PERFUSION W WO CONTRAST    HISTORY:   TIA the facial. Last seen well around 2100 last night.    TECHNIQUE:   Axial CT images of the brain without and with intravenous contrast using cerebral perfusion protocol. Post-processing parametric maps were created using RAPID software and reviewed. Radiation dose reduction techniques included automated exposure control  or exposure modulation based on body size. CT and nuclear cardiology exams in the last 12 months: 0.    COMPARISON:   None    FINDINGS:   Arterial input function is optimal.     No core infarct or ischemic penumbra is documented.      Impression:       Essentially normal CT brain perfusion.          Signer Name: Alexandra Alfaro MD   Signed: 8/16/2020 9:35 PM   Workstation Name:  [email or web address]    [facility]               I have reviewed the medications:  Scheduled Meds:    aspirin 81 mg Oral Daily   Or      aspirin 300 mg Rectal Daily   atorvastatin 80 mg Oral Nightly   clopidogrel 75 mg Oral Daily   LORazepam 0.25 mg Intravenous Once   pantoprazole 40 mg Intravenous Q AM   sodium chloride 10 mL Intravenous Q12H   sodium chloride 10 mL Intravenous Q12H     Continuous Infusions:   PRN Meds:.•  acetaminophen **OR** acetaminophen **OR** acetaminophen  •  docusate sodium  •  metoprolol tartrate  •  ondansetron **OR** ondansetron  •  sodium chloride  •  sodium chloride    Assessment/Plan   Assessment & Plan     Active Hospital Problems    Diagnosis  POA   • **Stroke (cerebrum) (CMS/[facility]) [I63.9]  Yes   • Essential hypertension [I10]  Unknown   • GERD without esophagitis [K21.9]  Unknown   • COPD (chronic obstructive pulmonary disease) (CMS/[facility]) [J44.9]  Unknown   • Hypothyroidism (acquired) [E03.9]  Unknown   • Hyperlipemia [E78.5]  Unknown      Resolved Hospital Problems   No resolved problems to display.        Brief Hospital Course to date:  Morelia Madden is a 75 y.o. female with past medical history of hyperlipidemia, hypothyroidism, COPD, GERD, hypertension.  Transferred from OSH after presenting with right facial droop, dysarthria and expressive aphasia, admitted for likely CVA/TIA.    Plan  Suspected CVA/TIA  -Patient with right-sided facial droop, dysarthria and expressive aphasia  -Stroke work-up ongoing per protocol, she is s/p CT head, CTA head and neck, follow-up MRI, carotid duplex  -Currently awaiting neurology evaluation  -Continue aspirin, Plavix, statin  -SLP, PT/OT to evaluate    Low back and leg pain  -Patient with elevated WBC, ESR and CRP  -Follow-up CT lumbar spine  -Continue pain control    Hypertension-BP currently well controlled, continue home meds of hydralazine, Norvasc and metoprolol    Hyperlipidemia-high-dose statin in place    COPD with ongoing  tobacco abuse, not in exacerbation  -Extensive counseled on cessation,    Hypothyroidism-continue Synthroid    DVT Prophylaxis: Mechanical    Disposition: TBD    CODE STATUS:   Code Status and Medical Interventions:   Ordered at: 08/16/20 2055     Level Of Support Discussed With:    Patient     Code Status:    CPR     Medical Interventions (Level of Support Prior to Arrest):    Full       Electronically signed by Kentrell Mohan MD, 08/17/20, 08:59.

## 2020-08-17 NOTE — PLAN OF CARE
Problem: Patient Care Overview  Goal: Plan of Care Review  Flowsheets (Taken 8/17/2020 7643)  Progress: no change  Plan of Care Reviewed With: patient  Outcome Summary: PT eval complete: Pt presenting with expressive aphasia, low back pain, weakness (L > R), poor coordination.  Min-A supine to sit, Mod-A sit to supine, Mod-A x2 transfers/ ambulation.  Pt able to ambulate 14' to bathroom, then 14' back to bed with RW, mod-A x2.  Pt with ataxic and weak gait, needed significant assist controlling walker, LLE more difficult to advance than RLE.  Apppropriate for IPPT, recommend IPR at d/c if pt agrees.

## 2020-08-17 NOTE — THERAPY EVALUATION
Acute Care - Speech Language Pathology Initial Eval/Discharge  [facility]   Cognitive-Communication Evaluation  Clinical Swallow Evaluation       Patient Name: Morelia Madden  : 1945  MRN: [id number]  Today's Date: 2020               Admit Date: 2020     Visit Dx:    ICD-10-CM ICD-9-CM   1. Dysarthria R47.1 784.51   2. Dysphagia, unspecified type R13.10 787.20     Patient Active Problem List   Diagnosis   • Stroke (cerebrum) (CMS/[facility]ly [facility])   • Essential hypertension   • GERD without esophagitis   • COPD (chronic obstructive pulmonary disease) (CMS/[facility])   • Hypothyroidism (acquired)   • Hyperlipemia     No past medical history on file.  No past surgical history on file.       SLP EVALUATION (last 72 hours)      SLP SLC Evaluation     Row Name 20 0915                   Communication Assessment/Intervention    Document Type  evaluation  -DV        Subjective Information  no complaints  -DV        Patient Observations  alert;cooperative  -DV        Patient/Family Observations  none present  -DV        Patient Effort  good  -DV           General Information    Patient Profile Reviewed  yes  -DV        Pertinent History Of Current Problem  75yoF w/ sudden onset dysarthria, L facial droop and difficulty swallowing. Hx CVA vs TIA ~1 yr ago (no residual deficits), HTN, HLD, COPD, tobacco abuse, hypothyroidism. MRI showed R hemipons infarct.  -DV        Precautions/Limitations, Vision  WFL;for purposes of eval  -DV        Precautions/Limitations, Hearing  WFL;for purposes of eval  -DV        Prior Level of Function-Communication  WFL  -DV        Plans/Goals Discussed with  patient  -DV        Barriers to Rehab  none identified  -DV        Patient's Goals for Discharge  patient did not state  -DV           Pain Assessment    Additional Documentation  Pain Scale: Word Pre/Post-Treatment (Group)  -DV           Pain Scale: Word Pre/Post-Treatment    Pain: Word Scale, Pretreatment  0 - no pain  -DV         Pain: Word Scale, Post-Treatment  0 - no pain  -DV           Comprehension Assessment/Intervention    Comprehension Assessment/Intervention  Auditory Comprehension  -DV           Auditory Comprehension Assessment/Intervention    Auditory Comprehension (Communication)  WFL  -DV        Answers Questions (Communication)  yes/no;WFL  -DV        Able to Follow Commands (Communication)  3-step;WFL  -DV        Narrative Discourse  conversational level;WFL  -DV           Expression Assessment/Intervention    Expression Assessment/Intervention  verbal expression  -DV           Verbal Expression Assessment/Intervention    Verbal Expression  WFL  -DV        Confrontational Naming  WFL  -DV        Spontaneous/Functional Words  WFL  -DV        Sentence Formulation  WFL  -DV        Conversational Discourse/Fluency  WFL  -DV           Oral Motor Structure and Function    Dentition Assessment  upper dentures/partial in place;lower dentures/partial in place  -DV           Oral Musculature and Cranial Nerve Assessment    Oral Motor General Assessment  oral labial or buccal impairment;lingual impairment;vocal impairment  -DV        Oral Labial or Buccal Impairment, Detail, Cranial Nerve VII (Facial):  left labial droop;reduced strength bilaterally;reduced ROM  -DV        Lingual Impairment, Detail. Cranial Nerves IX, XII (Glossopharyngeal and Hypoglossal)  reduced lingual ROM;reduced strength;bilaterally  -DV        Vocal Impairment, Detail. Cranial Nerve X (Vagus)  vocal quality abnormality (see comments)  -DV           Motor Speech Assessment/Intervention    Motor Speech Function  severe impairment  -DV        Characteristics Consistent with Dysarthria  decreased articulation;decreased breath support;slow rate;slurred speech  -DV           Cursory Voice Assessment/Intervention    Quality and Resonance (Voice)  harsh;rough;severe impairment  -DV           Cognitive Assessment Intervention- SLP    Orientation Status (Cognition)   person;place;time;situation;WFL  -DV        Thought Organization (Cognitive)  concrete divergent;mild impairment  -DV        Cognition, Comment  cog/comm skills not fully assessed this date  -DV           SLP Clinical Impressions    SLP Diagnosis  Pt presents with severe dysarthria involving articulation and vocal quality. Further assessment needed to determine cog/comm function. Language skills appear functional.  -DV        Rehab Potential/Prognosis  good  -DV        SLC Criteria for Skilled Therapy Interventions Met  yes  -DV        Functional Impact  functional impact in social situations;difficulty communicating in an emergency;difficulty in expressing complex messages;restrictions in personal and social life  -DV           Recommendations    Therapy Frequency (SLP SLC)  5 days per week  -DV        Predicted Duration Therapy Intervention (Days)  until discharge  -DV        Anticipated Dischage Disposition (SLP)  unknown;anticipate therapy at next level of care  -DV          User Key  (r) = Recorded By, (t) = Taken By, (c) = Cosigned By    Initials Name Effective Dates    DV Caprice Hurt, MS CCC-SLP 02/28/20 -            EDUCATION  The patient has been educated in the following areas:   Cognitive Impairment Communication Impairment.      SLP Recommendation and Plan  SLP Diagnosis: Pt presents with severe dysarthria involving articulation and vocal quality. Further assessment needed to determine cog/comm function. Language skills appear functional.     Swallow Criteria for Skilled Therapeutic Interventions Met: demonstrates skilled criteria  SLC Criteria for Skilled Therapy Interventions Met: yes  Anticipated Dischage Disposition (SLP): unknown, anticipate therapy at next level of care     Predicted Duration Therapy Intervention (Days): until discharge    Plan of Care Reviewed With: patient              Time Calculation:   Time Calculation- SLP     Row Name 08/17/20 1036             Time Calculation- SLP    SLP  Start Time  0915  -DV      SLP Received On  20  -DV        User Key  (r) = Recorded By, (t) = Taken By, (c) = Cosigned By    Initials Name Provider Type    Caprice Elias MS CCC-SLP Speech and Language Pathologist          Therapy Charges for Today     Code Description Service Date Service Provider Modifiers Qty    57853902345 HC ST EVAL ORAL PHARYNG SWALLOW 2 2020 Caprice Hurt MS CCC-SLP GN 1    46351020399 HC ST EVAL SPEECH AND PROD W LANG  2 2020 Caprice Hurt MS CCC-SLP GN 1                   SLP Discharge Summary  Anticipated Dischage Disposition (SLP): unknown, anticipate therapy at next level of care    Caprice Hurt MS CCC-SLP  2020  and Acute Care - Speech Language Pathology   Swallow Eval/Discharge  [place]     Patient Name: Morelia Madden  : 1945  MRN: [id number]  Today's Date: 2020               Admit Date: 2020    Visit Dx:    ICD-10-CM ICD-9-CM   1. Dysarthria R47.1 784.51   2. Dysphagia, unspecified type R13.10 787.20     Patient Active Problem List   Diagnosis   • Stroke (cerebrum) (CMS/[facility])   • Essential hypertension   • GERD without esophagitis   • COPD (chronic obstructive pulmonary disease) (CMS/[facility])   • Hypothyroidism (acquired)   • Hyperlipemia     No past medical history on file.  No past surgical history on file.       SWALLOW EVALUATION (last 72 hours)      SLP Adult Swallow Evaluation     Row Name 20 1000 20 0915                General Information    Current Method of Nutrition  --  NPO  -DV       Prior Level of Function-Swallowing  --  no diet consistency restrictions  -DV          Oral Motor and Function    Secretion Management  --  WNL/WFL  -DV       Mucosal Quality  --  moist, healthy  -DV       Volitional Cough  --  weak  -DV          General Eating/Swallowing Observations    Respiratory Support Currently in Use  room air  -DV  --       Eating/Swallowing Skills  self-fed;fed by SLP  -DV  --       Positioning During Eating  upright  in bed  -DV  --       Utensils Used  spoon;cup  -DV  --       Consistencies Trialed  thin liquids;nectar/syrup-thick liquids;pureed  -DV  --          Respiratory    Respiratory Status  WFL  -DV  --       Respiratory Pattern  decrease control/incoordination of breathing swallow  -DV  --          Clinical Swallow Eval    Oral Prep Phase  impaired  -DV  --       Oral Residue  WFL  -DV  --       Pharyngeal Phase  suspected pharyngeal impairment  -DV  --          Oral Prep Concerns    Oral Prep Concerns  incomplete or weak lip closure around spoon;anterior loss  -DV  --       Incomplete or Weak Lip Closure Around Spoon  all consistencies  -DV  --       Anterior Loss  thin;nectar  -DV  --          Pharyngeal Phase Concerns    Pharyngeal Phase Concerns  wet vocal quality;multiple swallows;cough;throat clear  -DV  --       Wet Vocal Quality  thin  -DV  --       Multiple Swallows  all consistencies  -DV  --       Cough  thin  -DV  --       Throat Clear  thin;nectar  -DV  --       Pharyngeal Phase Concerns, Comment  CSE completed: Pt with significant labial/lingual weakness and asymmetry, anterior loss of liquids, consistent cough response w/ thin liquid, inconsistent TC-ing with nectar-thick liquid, and no s/s w/ puree. Rec MBS today, NPO until then.   -DV  --          Clinical Impression    SLP Swallowing Diagnosis  mild-moderate;oral dysfunction;suspected pharyngeal dysfunction  -DV  --       Functional Impact  risk of aspiration/pneumonia  -DV  --       Rehab Potential/Prognosis, Swallowing  good, to achieve stated therapy goals  -DV  --       Swallow Criteria for Skilled Therapeutic Interventions Met  demonstrates skilled criteria  -DV  --          Recommendations    Predicted Duration Therapy Intervention (Days)  until discharge  -DV  --       SLP Diet Recommendation  NPO  -DV  --       Recommended Diagnostics  VFSS ([facility])  -DV  --       SLP Rec. for Method of Medication Administration  meds via alternate route  -DV  --        Anticipated Dischage Disposition (SLP)  unknown;anticipate therapy at next level of care  -DV  --         User Key  (r) = Recorded By, (t) = Taken By, (c) = Cosigned By    Initials Name Effective Dates    Caprice Elias MS CCC-SLP 02/28/20 -           EDUCATION  The patient has been educated in the following areas:   Dysphagia (Swallowing Impairment) NPO rationale.    SLP Recommendation and Plan  SLP Swallowing Diagnosis: mild-moderate, oral dysfunction, suspected pharyngeal dysfunction  SLP Diet Recommendation: NPO        Recommended Diagnostics: VFSS ([facility])  Swallow Criteria for Skilled Therapeutic Interventions Met: demonstrates skilled criteria  Anticipated Dischage Disposition (SLP): unknown, anticipate therapy at next level of care  Rehab Potential/Prognosis, Swallowing: good, to achieve stated therapy goals     Predicted Duration Therapy Intervention (Days): until discharge       Plan of Care Reviewed With: patient             Time Calculation:   Time Calculation- SLP     Row Name 08/17/20 1036             Time Calculation- SLP    SLP Start Time  0915  -DV      SLP Received On  08/17/20  -DV        User Key  (r) = Recorded By, (t) = Taken By, (c) = Cosigned By    Initials Name Provider Type    Caprice Elias MS CCC-SLP Speech and Language Pathologist          Therapy Charges for Today     Code Description Service Date Service Provider Modifiers Qty    69161242294 HC ST EVAL ORAL PHARYNG SWALLOW 2 8/17/2020 Caprice Hurt MS CCC-SLP GN 1    [id number] HC ST EVAL SPEECH AND PROD W LANG  2 8/17/2020 Caprice Hurt MS CCC-SLP GN 1            Patient was not wearing a face mask and did exhibit coughing during this therapy encounter.  Procedure performed was aerosolizing, involved close contact (within 6 feet for at least 15 minutes or longer), and did not involve contact with infectious secretions or specimens.  Therapist used appropriate personal protective equipment including gloves, standard procedure mask,  eye protection and N95 mask.  Appropriate PPE was worn during the entire therapy session.  Hand hygiene was completed before and after therapy session.        SLP Discharge Summary  Anticipated Dischage Disposition (SLP): unknown, anticipate therapy at next level of care    Caprice Hurt MS CCC-SLP  8/17/2020

## 2020-08-18 ENCOUNTER — APPOINTMENT (OUTPATIENT)
Dept: CARDIOLOGY | Facility: HOSPITAL | Age: 75
End: 2020-08-18

## 2020-08-18 ENCOUNTER — APPOINTMENT (OUTPATIENT)
Dept: MRI IMAGING | Facility: HOSPITAL | Age: 75
End: 2020-08-18

## 2020-08-18 LAB
BH CV ECHO MEAS - BSA(HAYCOCK): 1.4 M^2
BH CV ECHO MEAS - BSA: 1.4 M^2
BH CV ECHO MEAS - BZI_BMI: 18.7 KILOGRAMS/M^2
BH CV ECHO MEAS - BZI_METRIC_HEIGHT: 152.4 CM
BH CV ECHO MEAS - BZI_METRIC_WEIGHT: 43.5 KG
BH CV XLRA MEAS LEFT CCA RATIO VEL: 59.4 CM/SEC
BH CV XLRA MEAS LEFT DIST CCA EDV: 10.8 CM/SEC
BH CV XLRA MEAS LEFT DIST CCA PSV: 59.7 CM/SEC
BH CV XLRA MEAS LEFT DIST ICA EDV: 32.1 CM/SEC
BH CV XLRA MEAS LEFT DIST ICA PSV: 100.6 CM/SEC
BH CV XLRA MEAS LEFT ICA RATIO VEL: 129 CM/SEC
BH CV XLRA MEAS LEFT ICA/CCA RATIO: 2.2
BH CV XLRA MEAS LEFT MID CCA EDV: 15 CM/SEC
BH CV XLRA MEAS LEFT MID CCA PSV: 56.9 CM/SEC
BH CV XLRA MEAS LEFT MID ICA EDV: 33.3 CM/SEC
BH CV XLRA MEAS LEFT MID ICA PSV: 130.1 CM/SEC
BH CV XLRA MEAS LEFT PROX CCA EDV: 14.4 CM/SEC
BH CV XLRA MEAS LEFT PROX CCA PSV: 76 CM/SEC
BH CV XLRA MEAS LEFT PROX ECA EDV: 20.3 CM/SEC
BH CV XLRA MEAS LEFT PROX ECA PSV: 127.1 CM/SEC
BH CV XLRA MEAS LEFT PROX ICA EDV: 13.3 CM/SEC
BH CV XLRA MEAS LEFT PROX ICA PSV: 33.5 CM/SEC
BH CV XLRA MEAS LEFT PROX SCLA PSV: 292.3 CM/SEC
BH CV XLRA MEAS LEFT VERTEBRAL A EDV: 16.3 CM/SEC
BH CV XLRA MEAS LEFT VERTEBRAL A PSV: 56.6 CM/SEC
BH CV XLRA MEAS RIGHT CCA RATIO VEL: 48.6 CM/SEC
BH CV XLRA MEAS RIGHT DIST CCA EDV: 13.8 CM/SEC
BH CV XLRA MEAS RIGHT DIST CCA PSV: 48.8 CM/SEC
BH CV XLRA MEAS RIGHT DIST ICA EDV: 41.9 CM/SEC
BH CV XLRA MEAS RIGHT DIST ICA PSV: 137.6 CM/SEC
BH CV XLRA MEAS RIGHT ICA RATIO VEL: 138 CM/SEC
BH CV XLRA MEAS RIGHT ICA/CCA RATIO: 2.8
BH CV XLRA MEAS RIGHT MID CCA EDV: 14.9 CM/SEC
BH CV XLRA MEAS RIGHT MID CCA PSV: 84.4 CM/SEC
BH CV XLRA MEAS RIGHT MID ICA EDV: 25.8 CM/SEC
BH CV XLRA MEAS RIGHT MID ICA PSV: 122.9 CM/SEC
BH CV XLRA MEAS RIGHT PROX CCA EDV: 18.3 CM/SEC
BH CV XLRA MEAS RIGHT PROX CCA PSV: 176.4 CM/SEC
BH CV XLRA MEAS RIGHT PROX ECA EDV: 21.6 CM/SEC
BH CV XLRA MEAS RIGHT PROX ECA PSV: 134.6 CM/SEC
BH CV XLRA MEAS RIGHT PROX ICA EDV: 39.8 CM/SEC
BH CV XLRA MEAS RIGHT PROX ICA PSV: 139 CM/SEC
BH CV XLRA MEAS RIGHT PROX SCLA PSV: 191 CM/SEC
BH CV XLRA MEAS RIGHT VERTEBRAL A EDV: 13.1 CM/SEC
BH CV XLRA MEAS RIGHT VERTEBRAL A PSV: 70.7 CM/SEC
RIGHT ARM BP: NORMAL MMHG

## 2020-08-18 PROCEDURE — 92610 EVALUATE SWALLOWING FUNCTION: CPT

## 2020-08-18 PROCEDURE — 94799 UNLISTED PULMONARY SVC/PX: CPT

## 2020-08-18 PROCEDURE — 94640 AIRWAY INHALATION TREATMENT: CPT

## 2020-08-18 PROCEDURE — 72148 MRI LUMBAR SPINE W/O DYE: CPT

## 2020-08-18 PROCEDURE — 25010000002 MORPHINE PER 10 MG: Performed by: NURSE PRACTITIONER

## 2020-08-18 PROCEDURE — 93880 EXTRACRANIAL BILAT STUDY: CPT | Performed by: INTERNAL MEDICINE

## 2020-08-18 PROCEDURE — 25010000002 MORPHINE PER 10 MG: Performed by: INTERNAL MEDICINE

## 2020-08-18 PROCEDURE — 93880 EXTRACRANIAL BILAT STUDY: CPT

## 2020-08-18 PROCEDURE — 99232 SBSQ HOSP IP/OBS MODERATE 35: CPT | Performed by: INTERNAL MEDICINE

## 2020-08-18 PROCEDURE — 92507 TX SP LANG VOICE COMM INDIV: CPT

## 2020-08-18 PROCEDURE — 99231 SBSQ HOSP IP/OBS SF/LOW 25: CPT | Performed by: NURSE PRACTITIONER

## 2020-08-18 RX ORDER — OXYCODONE HYDROCHLORIDE AND ACETAMINOPHEN 5; 325 MG/1; MG/1
1 TABLET ORAL EVERY 8 HOURS PRN
Status: DISCONTINUED | OUTPATIENT
Start: 2020-08-18 | End: 2020-08-20 | Stop reason: HOSPADM

## 2020-08-18 RX ORDER — MORPHINE SULFATE 2 MG/ML
2 INJECTION, SOLUTION INTRAMUSCULAR; INTRAVENOUS ONCE
Status: COMPLETED | OUTPATIENT
Start: 2020-08-18 | End: 2020-08-18

## 2020-08-18 RX ORDER — IPRATROPIUM BROMIDE AND ALBUTEROL SULFATE 2.5; .5 MG/3ML; MG/3ML
3 SOLUTION RESPIRATORY (INHALATION) EVERY 4 HOURS PRN
Status: DISCONTINUED | OUTPATIENT
Start: 2020-08-18 | End: 2020-08-20 | Stop reason: HOSPADM

## 2020-08-18 RX ADMIN — SODIUM CHLORIDE, PRESERVATIVE FREE 10 ML: 5 INJECTION INTRAVENOUS at 09:35

## 2020-08-18 RX ADMIN — IPRATROPIUM BROMIDE AND ALBUTEROL SULFATE 3 ML: 2.5; .5 SOLUTION RESPIRATORY (INHALATION) at 01:49

## 2020-08-18 RX ADMIN — ACETAMINOPHEN 650 MG: 325 TABLET, FILM COATED ORAL at 10:01

## 2020-08-18 RX ADMIN — MORPHINE SULFATE 2 MG: 2 INJECTION, SOLUTION INTRAMUSCULAR; INTRAVENOUS at 06:25

## 2020-08-18 RX ADMIN — DICLOFENAC 2 G: 10 GEL TOPICAL at 13:14

## 2020-08-18 RX ADMIN — MORPHINE SULFATE 2 MG: 2 INJECTION, SOLUTION INTRAMUSCULAR; INTRAVENOUS at 17:59

## 2020-08-18 RX ADMIN — CITALOPRAM HYDROBROMIDE 10 MG: 20 TABLET ORAL at 22:07

## 2020-08-18 RX ADMIN — LEVOTHYROXINE SODIUM 100 MCG: 100 TABLET ORAL at 10:01

## 2020-08-18 RX ADMIN — ACETAMINOPHEN 650 MG: 325 TABLET, FILM COATED ORAL at 00:02

## 2020-08-18 RX ADMIN — CLOPIDOGREL BISULFATE 75 MG: 75 TABLET ORAL at 10:01

## 2020-08-18 RX ADMIN — DICLOFENAC 2 G: 10 GEL TOPICAL at 22:08

## 2020-08-18 RX ADMIN — PANTOPRAZOLE SODIUM 40 MG: 40 INJECTION, POWDER, FOR SOLUTION INTRAVENOUS at 06:05

## 2020-08-18 RX ADMIN — SODIUM CHLORIDE, PRESERVATIVE FREE 10 ML: 5 INJECTION INTRAVENOUS at 22:08

## 2020-08-18 RX ADMIN — ASPIRIN 81 MG: 81 TABLET, CHEWABLE ORAL at 10:01

## 2020-08-18 RX ADMIN — OXYCODONE AND ACETAMINOPHEN 1 TABLET: 5; 325 TABLET ORAL at 13:48

## 2020-08-18 RX ADMIN — DICLOFENAC 2 G: 10 GEL TOPICAL at 18:47

## 2020-08-18 RX ADMIN — DICLOFENAC 2 G: 10 GEL TOPICAL at 05:59

## 2020-08-18 RX ADMIN — DICLOFENAC 2 G: 10 GEL TOPICAL at 09:34

## 2020-08-18 RX ADMIN — ATORVASTATIN CALCIUM 80 MG: 40 TABLET, FILM COATED ORAL at 22:08

## 2020-08-18 RX ADMIN — OXYCODONE AND ACETAMINOPHEN 1 TABLET: 5; 325 TABLET ORAL at 21:57

## 2020-08-18 NOTE — THERAPY RE-EVALUATION
Acute Care - Speech Language Pathology Treatment Note  [facility]     Patient Name: Morelia Madden  : 1945  MRN: [id number]  Today's Date: 2020         Admit Date: 2020    Visit Dx:      ICD-10-CM ICD-9-CM   1. Dysarthria R47.1 784.51   2. Oropharyngeal dysphagia R13.12 787.22   3. Cognitive communication deficit R41.841 799.52     Patient Active Problem List   Diagnosis   • Stroke (cerebrum) (CMS/Formerly [facility])   • Essential hypertension   • GERD without esophagitis   • COPD (chronic obstructive pulmonary disease) (CMS/Formerly [facility])   • Hypothyroidism (acquired)   • Hyperlipemia        Therapy Treatment  Rehabilitation Treatment Summary     Row Name 20 0940             Treatment Time/Intention    Document Type  re-evaluation;therapy note (daily note)  -      Mode of Treatment  individual therapy;speech-language pathology  -      Care Plan Review  evaluation/treatment results reviewed;care plan/treatment goals reviewed;risks/benefits reviewed;current/potential barriers reviewed;patient/other agree to care plan  -      Therapy Frequency (SLP SLC)  5 days per week  -CH      Recorded by [] Lelo Stanford MS CCC-SLP 20 1116      Row Name 20 0940             Outcome Summary/Treatment Plan (SLP)    Daily Summary of Progress (SLP)  progress toward functional goals as expected  -      Plan for Continued Treatment (SLP)  Continue to follow to address communication and dysphagia in treatment.   -CH      Recorded by [] Lelo Stanford MS CCC-SLP 20 1116        User Key  (r) = Recorded By, (t) = Taken By, (c) = Cosigned By    Initials Name Effective Dates Discipline     Lelo Stanford MS CCC-SLP 20 -  SLP          EDUCATION  The patient has been educated in the following areas:   Cognitive Impairment Communication Impairment.    SLP Recommendation and Plan  Daily Summary of Progress (SLP): progress toward functional goals as expected     Plan for Continued Treatment  (SLP): Continue to follow to address communication and dysphagia in treatment.   Anticipated Dischage Disposition (SLP): unknown, anticipate therapy at next level of care             SLP GOALS     Row Name 08/18/20 0940 08/17/20 1100          Oral Nutrition/Hydration Goal 1 (SLP)    Oral Nutrition/Hydration Goal 1, SLP  LTG: Pt will demonstrate functional swallow for soft/thin diet with use of compensatory strategies as needed with 100% accuracy.  -CH  LTG: Pt will demonstrate functional swallow for soft/thin diet with use of compensatory strategies as needed with 100% accuracy.  -DV     Time Frame (Oral Nutrition/Hydration Goal 1, SLP)  short term goal (STG)  -CH  short term goal (STG)  -DV        Oral Nutrition/Hydration Goal 2 (SLP)    Oral Nutrition/Hydration Goal 2, SLP  Pt will tolerate current diet of puree and honey-thick liquids with no s/sx aspiration with 100% accuracy.  -CH  Pt will tolerate current diet of puree and honey-thick liquids with no s/sx aspiration with 100% accuracy.  -DV     Time Frame (Oral Nutrition/Hydration Goal 2, SLP)  short term goal (STG)  -CH  short term goal (STG)  -DV        Oral Nutrition/Hydration Goal (SLP)    Oral Nutrition/Hydration Goal, SLP  Pt will tolerate trials of thin liquids and soft solids with improved oral manipulation, mastication, and no s/sx aspiration with 100% accuracy.  -CH  Pt will tolerate trials of thin liquids and soft solids with improved oral manipulation, mastication, and no s/sx aspiration with 100% accuracy.  -DV     Time Frame (Oral Nutrition/Hydration Goal, SLP)  short term goal (STG)  -CH  short term goal (STG)  -DV        Labial Strengthening Goal 1 (SLP)    Activity (Labial Strengthening Goal 1, SLP)  increase labial tone  -  increase labial tone  -DV     Increase Labial Tone  labial resistance exercises  -  labial resistance exercises  -DV     [place]/Accuracy (Labial Strengthening Goal 1, SLP)  with minimal cues (75-90% accuracy)   -CH  with minimal cues (75-90% accuracy)  -DV     Time Frame (Labial Strengthening Goal 1, SLP)  short term goal (STG)  -CH  short term goal (STG)  -DV        Lingual Strengthening Goal 1 (SLP)    Activity (Lingual Strengthening Goal 1, SLP)  increase tongue back strength;increase lingual tone/sensation/control/coordination/movement  -CH  increase tongue back strength;increase lingual tone/sensation/control/coordination/movement  -DV     Increase Lingual Tone/Sensation/Control/Coordination/Movement  lingual movement exercises  -CH  lingual movement exercises  -DV     Increase Tongue Back Strength  lingual resistance exercises  -CH  lingual resistance exercises  -DV     [place]/Accuracy (Lingual Strengthening Goal 1, SLP)  with minimal cues (75-90% accuracy)  -CH  with minimal cues (75-90% accuracy)  -DV     Time Frame (Lingual Strengthening Goal 1, SLP)  short term goal (STG)  -CH  short term goal (STG)  -DV        Pharyngeal Strengthening Exercise Goal 1 (SLP)    Activity (Pharyngeal Strengthening Goal 1, SLP)  increase timing;increase superior movement of the hyolaryngeal complex;increase anterior movement of the hyolaryngeal complex;increase closure at entrance to airway/closure of airway at glottis;increase squeeze/positive pressure generation;increase tongue base retraction  -CH  increase timing;increase superior movement of the hyolaryngeal complex;increase anterior movement of the hyolaryngeal complex;increase closure at entrance to airway/closure of airway at glottis;increase squeeze/positive pressure generation;increase tongue base retraction  -DV     Increase Timing  prepping - 3 second prep or suck swallow or 3-step swallow  -CH  prepping - 3 second prep or suck swallow or 3-step swallow  -DV     Increase Superior Movement of the Hyolaryngeal Complex  falsetto;Mendelsohn  -CH  falsetto;Mendelsohn  -DV     Increase Anterior Movement of the Hyolaryngeal Complex  shaker;chin tuck against resistance  (CTAR)  -CH  shaker;chin tuck against resistance (CTAR)  -DV     Increase Closure at Entrance to Airway/Closure of Airway at Glottis  breath hold exercises;super-supraglottic swallow  -CH  breath hold exercises;super-supraglottic swallow  -DV     Increase Squeeze/Positive Pressure Generation  hard effortful swallow  -CH  hard effortful swallow  -DV     Increase Tongue Base Retraction  jaylon  -CH  jaylon  -DV     [place]/Accuracy (Pharyngeal Strengthening Goal 1, SLP)  with minimal cues (75-90% accuracy)  -CH  with minimal cues (75-90% accuracy)  -DV     Time Frame (Pharyngeal Strengthening Goal 1, SLP)  short term goal (STG)  -CH  short term goal (STG)  -DV        Swallow Management Recall Goal 1 (SLP)    Activity (Swallow Management Recall Goal 1, SLP)  recall of;safe diet/liquid level;compensatory swallow strategies/techniques  -CH  recall of;safe diet/liquid level;compensatory swallow strategies/techniques  -DV     [place]/Accuracy (Swallow Management Recall Goal 1, SLP)  with minimal cues (75-90% accuracy)  -CH  with minimal cues (75-90% accuracy)  -DV     Time Frame (Swallow Management Recall Goal 1, SLP)  short term goal (STG)  -CH  short term goal (STG)  -DV        Swallow Compensatory Strategies Goal 1 (SLP)    Activity (Swallow Compensatory Strategies/Techniques Goal 1, SLP)  compensatory strategies;small bites;small cup sips;alternate food/liquid intake  -CH  compensatory strategies;small bites;small cup sips;alternate food/liquid intake  -DV     [place]/Accuracy (Swallow Compensatory Strategies/Techniques Goal 1, SLP)  with minimal cues (75-90% accuracy)  -CH  with minimal cues (75-90% accuracy)  -DV     Time Frame (Swallow Compensatory Strategies/Techniques Goal 1, SLP)  short term goal (STG)  -CH  short term goal (STG)  -DV        Respiratory Support Goal 1 (SLP)    Improve Respiratory Support Goal 1 (SLP)  sustaining a vowel sound on exhalation;repeating a sentence on  exhalation;80%;with minimal cues (75-90%)  -CH  --     Time Frame (Respiratory Support Goal 1, SLP)  by discharge  -  --        Articulation Goal 1 (SLP)    Improve Articulation Goal 1 (SLP)  by over-articulating at phrase level;80%;with minimal cues (75-90%)  -CH  --     Time Frame (Articulation Goal 1, SLP)  by discharge  -  --        Orientation Goal 1 (SLP)    Improve Orientation Through Goal 1 (SLP)  demonstrating orientation to day;demonstrating orientation to month;demonstrating orientation to year;demonstrating orientation to place;demonstrating orientation to disease/impairment;use environmental aids to assist with orientation;80%;with minimal cues (75-90%)  -CH  --     Time Frame (Orientation Goal 1, SLP)  by discharge  -  --        Memory Skills Goal 1 (SLP)    Improve Memory Skills Through Goal 1 (SLP)  recall details from a word list;repeat list in sequential order;listen to a paragraph and answer questions;recall details of the day;80%;with minimal cues (75-90%)  -CH  --     Time Frame (Memory Skills Goal 1, SLP)  by discharge  -CH  --        Additional Goal 1 (SLP)    Additional Goal 1, SLP  LTG: Patient will improve cognitive linguistic skills to WFL in orde to improve functional communication , safety and independence at next level of care.   -CH  --     Time Frame (Additional Goal 1, SLP)  by discharge  -  --       User Key  (r) = Recorded By, (t) = Taken By, (c) = Cosigned By    Initials Name Provider Type    Lelo Gifford MS CCC-SLP Speech and Language Pathologist    Caprice Elias MS CCC-SLP Speech and Language Pathologist              Time Calculation:     Time Calculation- SLP     Row Name 08/18/20 1122             Time Calculation- SLP    SLP Start Time  0940  -      SLP Received On  08/18/20  -        User Key  (r) = Recorded By, (t) = Taken By, (c) = Cosigned By    Initials Name Provider Type    Lelo Gifford MS CCC-SLP Speech and Language Pathologist           Therapy Charges for Today     Code Description Service Date Service Provider Modifiers Qty    62265561277 HC ST EVAL ORAL PHARYNG SWALLOW 3 2020 Lelo Stanford, MS CCC-SLP GN 1    [id number] HC ST TREATMENT SPEECH 3 2020 Lelo Stanford, MS CCC-SLP GN 1                     Lelo Stanford, MS CCC-SLP  2020   and Acute Care - Speech Language Pathology   Swallow Re-Evaluation  [place]   Clinical Swallow Evaluation       Patient Name: Morelia Madden  : 1945  MRN: [id number]  Today's Date: 2020        Referring Physician: JANICE Garcia      Admit Date: 2020    Visit Dx:     ICD-10-CM ICD-9-CM   1. Dysarthria R47.1 784.51   2. Oropharyngeal dysphagia R13.12 787.22   3. Cognitive communication deficit R41.841 799.52     Patient Active Problem List   Diagnosis   • Stroke (cerebrum) (CMS/Formerly [facility])   • Essential hypertension   • GERD without esophagitis   • COPD (chronic obstructive pulmonary disease) (CMS/Formerly [facility])   • Hypothyroidism (acquired)   • Hyperlipemia     No past medical history on file.  No past surgical history on file.     SWALLOW EVALUATION (last 72 hours)      SLP Adult Swallow Evaluation     Row Name 20 0940 20 1100 20 1000 20 0915          Rehab Evaluation    Document Type  --  evaluation  -DV  --  --     Subjective Information  complains of;pain  -  no complaints  -DV  --  --     Patient Observations  alert;cooperative;agree to therapy  -CH  alert;cooperative  -DV  --  --     Patient/Family Observations  No family present  -CH  none present  -DV  --  --     Patient Effort  good  -CH  good  -DV  --  --     Comment  --  Evaluation in collaboration with RD.  -DV  --  --        General Information    Patient Profile Reviewed  yes  -CH  yes  -DV  --  --     Pertinent History Of Current Problem  See initial evaluation. [facility] [place] completed 20 with recommendations for pureed diet and HT liquids. RE-eval ordered d/t patient coughing [facility]   -  See initial eval  -DV  --  --     Current Method of Nutrition  NPO  -  NPO  -DV  --  NPO  -DV     Precautions/Limitations, Vision  WFL;for purposes of eval  -CH  --  --  --     Precautions/Limitations, Hearing  WFL;for purposes of eval  -CH  --  --  --     Prior Level of Function-Swallowing  no diet consistency restrictions  -  no diet consistency restrictions  -DV  --  no diet consistency restrictions  -DV     Plans/Goals Discussed with  patient  -  patient  -DV  --  --     Barriers to Rehab  cognitive status  -  cognitive status  -DV  --  --        Pain Assessment    Additional Documentation  Pain Scale: FACES Pre/Post-Treatment (Group);Pain Scale: Numbers Pre/Post-Treatment (Group)  -  Pain Scale: Word Pre/Post-Treatment (Group)  -DV  --  --        Pain Scale: Numbers Pre/Post-Treatment    Pain Scale: Numbers, Pretreatment  7/10  -  --  --  --     Pain Scale: Numbers, Post-Treatment  7/10  -  --  --  --     Pain Location - Orientation  lower  -  --  --  --     Pain Location  back  -  --  --  --     Pain Intervention(s)  Other (Comment) RN notified and patient repositioned.   -  --  --  --        Pain Scale: Word Pre/Post-Treatment    Pain: Word Scale, Pretreatment  --  0 - no pain  -DV  --  --     Pain: Word Scale, Post-Treatment  --  0 - no pain  -DV  --  --        Oral Motor and Function    Dentition Assessment  upper dentures/partial in place;lower dentures/partial in place  -  upper dentures/partial in place;lower dentures/partial in place  -DV  --  --     Secretion Management  WNL/WFL  -  --  --  WNL/WFL  -DV     Mucosal Quality  moist, healthy  -  --  --  moist, healthy  -DV     Volitional Cough  weak  -  --  --  weak  -DV        Oral Musculature and Cranial Nerve Assessment    Oral Motor General Assessment  oral labial or buccal impairment;lingual impairment;vocal impairment  -  --  --  --     Oral Labial or Buccal Impairment, Detail, Cranial Nerve VII  (Facial):  left labial droop;reduced strength bilaterally;reduced ROM  -  --  --  --     Lingual Impairment, Detail. Cranial Nerves IX, XII (Glossopharyngeal and Hypoglossal)  reduced lingual ROM;reduced strength;bilaterally  -  --  --  --     Vocal Impairment, Detail. Cranial Nerve X (Vagus)  vocal quality abnormality (see comments)  -  --  --  --        General Eating/Swallowing Observations    Respiratory Support Currently in Use  room air  -  room air  -DV  room air  -DV  --     Eating/Swallowing Skills  self-fed;fed by SLP  -CH  self-fed;fed by SLP  -DV  self-fed;fed by SLP  -DV  --     Positioning During Eating  upright in chair  -  upright in chair  -DV  upright in bed  -DV  --     Utensils Used  spoon;cup  -  --  spoon;cup  -DV  --     Consistencies Trialed  pureed;honey-thick liquids grahm cracker crushed in pureed to simulate meds in pureed  -  --  thin liquids;nectar/syrup-thick liquids;pureed  -DV  --        Respiratory    Respiratory Status  WFL  -CH  WFL  -DV  WFL  -DV  --     Respiratory Pattern  decrease control/incoordination of breathing swallow  -CH  decrease control/incoordination of breathing swallow  -DV  decrease control/incoordination of breathing swallow  -DV  --        Clinical Swallow Eval    Oral Prep Phase  impaired  -CH  --  impaired  -DV  --     Oral Residue  WFL  -CH  --  WFL  -DV  --     Pharyngeal Phase  suspected pharyngeal impairment  -  --  suspected pharyngeal impairment  -DV  --        Oral Prep Concerns    Oral Prep Concerns  incomplete or weak lip closure around spoon;anterior loss  -CH  --  incomplete or weak lip closure around spoon;anterior loss  -DV  --     Incomplete or Weak Lip Closure Around Spoon  all consistencies  -CH  --  all consistencies  -DV  --     Anterior Loss  pudding;honey  -CH  --  thin;nectar  -DV  --        Pharyngeal Phase Concerns    Pharyngeal Phase Concerns  --  --  wet vocal quality;multiple swallows;cough;throat clear  -DV  --      Wet Vocal Quality  --  --  thin  -DV  --     Multiple Swallows  --  --  all consistencies  -DV  --     Cough  --  --  thin  -DV  --     Throat Clear  --  --  thin;nectar  -DV  --     Pharyngeal Phase Concerns, Comment  CSE completed. Patient trialed with HT liquids, pureed and cracker crushed in pureed to simulate meds in pureed. No s/s of aspiration or difficulty with any trials. Per MBS patient should take sm bites, sips, alternate liquids and solids, sit uprigt at 90 degrees and no straw. Recommend continuation of pureed and HT liquids with use of precautions. Meds should be crushed in pureed.   -CH  --  CSE completed: Pt with significant labial/lingual weakness and asymmetry, anterior loss of liquids, consistent cough response w/ thin liquid, inconsistent TC-ing with nectar-thick liquid, and no s/s w/ puree. Rec MBS today, NPO until then.   -DV  --        MBS/VFSS    Utensils Used  --  spoon;cup  -DV  --  --     Consistencies Trialed  --  thin liquids;nectar/syrup-thick liquids;honey-thick liquids;pureed;regular textures  -DV  --  --        MBS/VFSS Interpretation    Oral Prep Phase  --  impaired oral phase of swallowing  -DV  --  --     Oral Transit Phase  --  impaired  -DV  --  --     Oral Residue  --  impaired  -DV  --  --        Oral Preparatory Phase    Oral Preparatory Phase  --  reduced lip closure around utensil;anterior loss;prolonged manipulation  -DV  --  --     Reduced Lip Closure Around Utensil  --  all consistencies tested;secondary to reduced labial seal  -DV  --  --     Anterior Loss  --  thin liquids;secondary to reduced labial seal  -DV  --  --     Prolonged Manipulation  --  all consistencies tested  -DV  --  --        Oral Transit Phase    Impaired Oral Transit Phase  --  tongue pumping;premature spillage of liquids into pharynx  -DV  --  --     Delayed Initiation of bolus transit  --  --  -DV  --  --     Tongue Pumping  --  all consistencies tested;discoordination of lingual movement  -DV   --  --     Premature Spillage of Liquids into Pharynx  --  thin liquids;nectar-thick liquids;honey-thick liquids;pudding/puree;secondary to reduced lingual control  -DV  --  --        Oral Residue    Impaired Oral Residue  --  diffuse residue throughout oral cavity  -DV  --  --     Diffuse Residue throughout Oral Cavity  --  all consistencies tested;secondary to reduced lingual strength;secondary to reduced lingual range of motion  -DV  --  --     Response to Oral Residue  --  unable to clear residue;with liquid wash;other (see comments) compensatory options not trialed 2' cognitive status  -DV  --  --        Initiation of Pharyngeal Swallow    Initiation of Pharyngeal Swallow  --  bolus in pyriform sinuses  -DV  --  --     Pharyngeal Phase  --  impaired pharyngeal phase of swallowing  -DV  --  --     Penetration Before the Swallow  --  thin liquids;nectar-thick liquids;secondary to reduced back of tongue control;secondary to delayed swallow initiation or mistiming  -DV  --  --     Penetration During the Swallow  --  thin liquids;nectar-thick liquids;secondary to delayed swallow initiation or mistiming;secondary to reduced laryngeal elevation;secondary to reduced vestibular closure  -DV  --  --     Aspiration During the Swallow  --  thin liquids;nectar-thick liquids;secondary to delayed swallow initiation or mistiming;secondary to reduced laryngeal elevation;secondary to reduced vestibular closure  -DV  --  --     Response to Penetration  --  no response  -DV  --  --     Response to Aspiration  --  no response, silent aspiration;could not clear subglottic material;weak cough/throat clear  -DV  --  --     Rosenbek's Scale  --  thin:;nectar:;8--->level 8;honey:;pudding/puree:;regular textures:;1--->level 1  -DV  --  --     Pharyngeal Residue  --  all consistencies tested;diffuse within pharynx;secondary to reduced base of tongue retraction;secondary to reduced posterior pharyngeal wall stripping;secondary to reduced  laryngeal elevation;secondary to reduced hyolaryngeal excursion  -DV  --  --     Response to Residue  --  unable to clear residue  -DV  --  --     Attempted Compensatory Maneuvers  --  bolus size;bolus presentation style  -DV  --  --     Response to Attempted Compensatory Maneuvers  --  did not prevent penetration;did not prevent aspiration;did not reduce residue  -DV  --  --     Pharyngeal Phase, Comment  --  MBS completed: Reduced labial strength resulted in anterior loss of thin liquids. Discoordination of lingual movement resulted in prolonged manipulation of bolus across all consistencies, but adequate for pudding and liquid consistencies. Reduced back of tongue control resulted in pre-mature spillage of bolus with thin, NTL, HTL, and pudding consistencies. Spillage was most often to the valleculae and spilling over the superior edge of the epiglottis, x1 spillage to the pyriform sinuses with thin liquids. Delayed initiation of the swallow with thin, NTL, HTL, and pudding. Penetration: back of tongue control and delayed initiation of swallow resulted in consistent penetration of pre-spilled thin and nectar thick liquids before the swallow, that did not clear from laryngeal vestibule despite cued cough. Aspiration: reduced hyolaryngeal elevation/excursion, reduced vestibular closure, and delayed initiation of the swallow resulted in aspiration of thin and nectar thick liquids that did not clear despite cued cough. Aspiration was silent. No penetration/aspiration with honey-thick liquids, pudding, or regular consistencies. Reduced tongue base retraction, pharyngeal stripping, and hyolaryngeal elevation/excursion resulted in mild, diffuse pharyngeal residue that did not clear with subsequent trials. Additional compensations not trialed due to pt's cognitive status.  -DV  --  --        Clinical Impression    SLP Swallowing Diagnosis  functional oral phase;functional pharyngeal phase;other (see comments) for  consistencies trialed (pureed and HT liquids)  -  moderate;oral dysfunction;pharyngeal dysfunction  -DV  mild-moderate;oral dysfunction;suspected pharyngeal dysfunction  -DV  --     Functional Impact  risk of aspiration/pneumonia  -  risk of aspiration/pneumonia  -DV  risk of aspiration/pneumonia  -DV  --     Rehab Potential/Prognosis, Swallowing  good, to achieve stated therapy goals  -CH  good, to achieve stated therapy goals  -DV  good, to achieve stated therapy goals  -DV  --     Swallow Criteria for Skilled Therapeutic Interventions Met  demonstrates skilled criteria  -CH  demonstrates skilled criteria  -DV  demonstrates skilled criteria  -DV  --        Recommendations    Therapy Frequency (Swallow)  5 days per week  -  5 days per week  -DV  --  --     Predicted Duration Therapy Intervention (Days)  until discharge  -CH  until discharge  -DV  until discharge  -DV  --     SLP Diet Recommendation  puree;honey thick liquids  -  puree;honey thick liquids  -DV  NPO  -DV  --     Recommended Diagnostics  --  --  VFSS (MBS)  -DV  --     Recommended Precautions and Strategies  upright posture during/after eating;small bites of food and sips of liquid;no straw;alternate between small bites of food and sips of liquid  -  upright posture during/after eating;small bites of food and sips of liquid;no straw;alternate between small bites of food and sips of liquid  -DV  --  --     SLP Rec. for Method of Medication Administration  meds crushed;with pudding or applesauce  -  meds whole;with pudding or applesauce;as tolerated  -  meds via alternate route  -DV  --     Monitor for Signs of Aspiration  yes;notify SLP if any concerns  -  yes;notify SLP if any concerns  -DV  --  --     Anticipated Dischage Disposition (SLP)  unknown;anticipate therapy at next level of care  -  unknown;anticipate therapy at next level of care  -DV  unknown;anticipate therapy at next level of care  -DV  --       User Key  (r) =  Recorded By, (t) = Taken By, (c) = Cosigned By    Initials Name Effective Dates    CH AbdoulayeLelo, MS CCC-SLP 08/09/20 -     DV Caprice Hurt, MS CCC-SLP 02/28/20 -           EDUCATION  The patient has been educated in the following areas:   Home Exercise Program (HEP) Dysphagia (Swallowing Impairment) Modified Diet Instruction.    SLP Recommendation and Plan  SLP Swallowing Diagnosis: functional oral phase, functional pharyngeal phase, other (see comments)(for consistencies trialed (pureed and HT liquids))  SLP Diet Recommendation: puree, honey thick liquids  Recommended Precautions and Strategies: upright posture during/after eating, small bites of food and sips of liquid, no straw, alternate between small bites of food and sips of liquid  SLP Rec. for Method of Medication Administration: meds crushed, with pudding or applesauce     Monitor for Signs of Aspiration: yes, notify SLP if any concerns     Swallow Criteria for Skilled Therapeutic Interventions Met: demonstrates skilled criteria  Anticipated Dischage Disposition (SLP): unknown, anticipate therapy at next level of care  Rehab Potential/Prognosis, Swallowing: good, to achieve stated therapy goals  Therapy Frequency (Swallow): 5 days per week  Predicted Duration Therapy Intervention (Days): until discharge            SLP GOALS     Row Name 08/18/20 0940 08/17/20 1100          Oral Nutrition/Hydration Goal 1 (SLP)    Oral Nutrition/Hydration Goal 1, SLP  LTG: Pt will demonstrate functional swallow for soft/thin diet with use of compensatory strategies as needed with 100% accuracy.  -  LTG: Pt will demonstrate functional swallow for soft/thin diet with use of compensatory strategies as needed with 100% accuracy.  -DV     Time Frame (Oral Nutrition/Hydration Goal 1, SLP)  short term goal (STG)  -  short term goal (STG)  -DV        Oral Nutrition/Hydration Goal 2 (SLP)    Oral Nutrition/Hydration Goal 2, SLP  Pt will tolerate current diet of puree and  honey-thick liquids with no s/sx aspiration with 100% accuracy.  -CH  Pt will tolerate current diet of puree and honey-thick liquids with no s/sx aspiration with 100% accuracy.  -DV     Time Frame (Oral Nutrition/Hydration Goal 2, SLP)  short term goal (STG)  -CH  short term goal (STG)  -DV        Oral Nutrition/Hydration Goal (SLP)    Oral Nutrition/Hydration Goal, SLP  Pt will tolerate trials of thin liquids and soft solids with improved oral manipulation, mastication, and no s/sx aspiration with 100% accuracy.  -  Pt will tolerate trials of thin liquids and soft solids with improved oral manipulation, mastication, and no s/sx aspiration with 100% accuracy.  -DV     Time Frame (Oral Nutrition/Hydration Goal, SLP)  short term goal (STG)  -CH  short term goal (STG)  -DV        Labial Strengthening Goal 1 (SLP)    Activity (Labial Strengthening Goal 1, SLP)  increase labial tone  -  increase labial tone  -DV     Increase Labial Tone  labial resistance exercises  -  labial resistance exercises  -DV     [place]/Accuracy (Labial Strengthening Goal 1, SLP)  with minimal cues (75-90% accuracy)  -CH  with minimal cues (75-90% accuracy)  -DV     Time Frame (Labial Strengthening Goal 1, SLP)  short term goal (STG)  -CH  short term goal (STG)  -DV        Lingual Strengthening Goal 1 (SLP)    Activity (Lingual Strengthening Goal 1, SLP)  increase tongue back strength;increase lingual tone/sensation/control/coordination/movement  -  increase tongue back strength;increase lingual tone/sensation/control/coordination/movement  -DV     Increase Lingual Tone/Sensation/Control/Coordination/Movement  lingual movement exercises  -  lingual movement exercises  -DV     Increase Tongue Back Strength  lingual resistance exercises  -  lingual resistance exercises  -DV     [place]/Accuracy (Lingual Strengthening Goal 1, SLP)  with minimal cues (75-90% accuracy)  -CH  with minimal cues (75-90% accuracy)  -DV     Time  Frame (Lingual Strengthening Goal 1, SLP)  short term goal (STG)  -CH  short term goal (STG)  -DV        Pharyngeal Strengthening Exercise Goal 1 (SLP)    Activity (Pharyngeal Strengthening Goal 1, SLP)  increase timing;increase superior movement of the hyolaryngeal complex;increase anterior movement of the hyolaryngeal complex;increase closure at entrance to airway/closure of airway at glottis;increase squeeze/positive pressure generation;increase tongue base retraction  -CH  increase timing;increase superior movement of the hyolaryngeal complex;increase anterior movement of the hyolaryngeal complex;increase closure at entrance to airway/closure of airway at glottis;increase squeeze/positive pressure generation;increase tongue base retraction  -DV     Increase Timing  prepping - 3 second prep or suck swallow or 3-step swallow  -CH  prepping - 3 second prep or suck swallow or 3-step swallow  -DV     Increase Superior Movement of the Hyolaryngeal Complex  falsetto;Mendelsohn  -CH  falsetto;Mendelsohn  -DV     Increase Anterior Movement of the Hyolaryngeal Complex  shaker;chin tuck against resistance (CTAR)  -CH  shaker;chin tuck against resistance (CTAR)  -DV     Increase Closure at Entrance to Airway/Closure of Airway at Glottis  breath hold exercises;super-supraglottic swallow  -CH  breath hold exercises;super-supraglottic swallow  -DV     Increase Squeeze/Positive Pressure Generation  hard effortful swallow  -CH  hard effortful swallow  -DV     Increase Tongue Base Retraction  jaylon  -CH  jaylon  -DV     [place]/Accuracy (Pharyngeal Strengthening Goal 1, SLP)  with minimal cues (75-90% accuracy)  -CH  with minimal cues (75-90% accuracy)  -DV     Time Frame (Pharyngeal Strengthening Goal 1, SLP)  short term goal (STG)  -CH  short term goal (STG)  -DV        Swallow Management Recall Goal 1 (SLP)    Activity (Swallow Management Recall Goal 1, SLP)  recall of;safe diet/liquid level;compensatory swallow  strategies/techniques  -  recall of;safe diet/liquid level;compensatory swallow strategies/techniques  -DV     [place]/Accuracy (Swallow Management Recall Goal 1, SLP)  with minimal cues (75-90% accuracy)  -CH  with minimal cues (75-90% accuracy)  -DV     Time Frame (Swallow Management Recall Goal 1, SLP)  short term goal (STG)  -CH  short term goal (STG)  -DV        Swallow Compensatory Strategies Goal 1 (SLP)    Activity (Swallow Compensatory Strategies/Techniques Goal 1, SLP)  compensatory strategies;small bites;small cup sips;alternate food/liquid intake  -  compensatory strategies;small bites;small cup sips;alternate food/liquid intake  -DV     [place]/Accuracy (Swallow Compensatory Strategies/Techniques Goal 1, SLP)  with minimal cues (75-90% accuracy)  -CH  with minimal cues (75-90% accuracy)  -DV     Time Frame (Swallow Compensatory Strategies/Techniques Goal 1, SLP)  short term goal (STG)  -CH  short term goal (STG)  -DV        Respiratory Support Goal 1 (SLP)    Improve Respiratory Support Goal 1 (SLP)  sustaining a vowel sound on exhalation;repeating a sentence on exhalation;80%;with minimal cues (75-90%)  -  --     Time Frame (Respiratory Support Goal 1, SLP)  by discharge  -  --        Articulation Goal 1 (SLP)    Improve Articulation Goal 1 (SLP)  by over-articulating at phrase level;80%;with minimal cues (75-90%)  -  --     Time Frame (Articulation Goal 1, SLP)  by discharge  -  --        Orientation Goal 1 (SLP)    Improve Orientation Through Goal 1 (SLP)  demonstrating orientation to day;demonstrating orientation to month;demonstrating orientation to year;demonstrating orientation to place;demonstrating orientation to disease/impairment;use environmental aids to assist with orientation;80%;with minimal cues (75-90%)  -  --     Time Frame (Orientation Goal 1, SLP)  by discharge  -  --        Memory Skills Goal 1 (SLP)    Improve Memory Skills Through Goal 1 (SLP)  recall  details from a word list;repeat list in sequential order;listen to a paragraph and answer questions;recall details of the day;80%;with minimal cues (75-90%)  -CH  --     Time Frame (Memory Skills Goal 1, SLP)  by discharge  -CH  --        Additional Goal 1 (SLP)    Additional Goal 1, SLP  LTG: Patient will improve cognitive linguistic skills to WFL in orde to improve functional communication , safety and independence at next level of care.   -CH  --     Time Frame (Additional Goal 1, SLP)  by discharge  -CH  --       User Key  (r) = Recorded By, (t) = Taken By, (c) = Cosigned By    Initials Name Provider Type    Lelo Gifford MS CCC-SLP Speech and Language Pathologist    Caprice Elias MS CCC-SLP Speech and Language Pathologist             Time Calculation:   Time Calculation- SLP     Row Name 08/18/20 1122             Time Calculation- SLP    SLP Start Time  0940  -      SLP Received On  08/18/20  -        User Key  (r) = Recorded By, (t) = Taken By, (c) = Cosigned By    Initials Name Provider Type    Lelo Gifford MS CCC-SLP Speech and Language Pathologist          Therapy Charges for Today     Code Description Service Date Service Provider Modifiers Qty    [id number]  ST EVAL ORAL PHARYNG SWALLOW 3 8/18/2020 Lelo Stanford MS CCC-SLP GN 1    [id number]  ST TREATMENT SPEECH 3 8/18/2020 Lelo Stanford MS CCC-SLP GN 1            Patient was not wearing a face mask and did not exhibit coughing during this therapy encounter.  Procedure performed was aerosolizing, involved close contact (within 6 feet for at least 15 minutes or longer), and did not involve contact with infectious secretions or specimens.  Therapist used appropriate personal protective equipment including gloves, standard procedure mask and eye protection.  Appropriate PPE was worn during the entire therapy session.  Hand hygiene was completed before and after therapy session.       Lelo Stanford MS  CCC-SLP  8/18/2020

## 2020-08-18 NOTE — PLAN OF CARE
"Slept often.  Oriented except to situation.  NIH \"5\" with facial asymmetry, aphasia, and dysarthria.  Respirations unlabored.  Monitor NSR/ST.  PRN Tylenol and Percocet each once for back pain.  Morphine once to medicate in MRI.  Up to bedside commode 1 assist.  Updated daughter and daughter-in-law (POA) by phone multiple times.  Gabby, whom patient resides with, fluctuated with each phone call regarding ability to care for patient after discharge.      "

## 2020-08-18 NOTE — PROGRESS NOTES
Stroke Progress Note       Chief Complaint:  Left facial droop    Subjective    Subjective     Subjective:  Patient's major complaint is of back pain.  She continues to point to her lower back area.  Her speech is significantly dysarthric but able to answer some questions and is oriented.  Follows commands.   Spoke to daughter Gabby about patient's ongoing back pain. She reports that this is chronic in nature and she has had 2 different surgeries for this. I reviewed CT lumbar spine results with daughter and she would like to pursue a full work up at this time. She will consider those results with brother Jasson if any intervention is recommended. Prior to hospitalization she only took Tylenol PRN for pain control       Review of Systems   Constitutional: looks fatigued  HENT: Negative for nosebleeds and rhinorrhea.    Eyes: Negative for redness.   Respiratory: Negative for cough.    Gastrointestinal: Negative for anal bleeding.   Endocrine: Negative for polydipsia.   Genitourinary: Negative for enuresis and urgency.   Musculoskeletal: back pain  Neurological: Negative for tremors.   Psychiatric/Behavioral: Negative for hallucinations.     Objective      Temp:  [97.8 °F (36.6 °C)-98.4 °F (36.9 °C)] 98.2 °F (36.8 °C)  Heart Rate:  [] 112  Resp:  [14-18] 14  BP: (157-184)/(69-98) 184/90    Neurological Exam  Mental Status  Awake. Oriented only to person and place. Severe dysarthria present. Expressive aphasia present. Can answer simple questions. Follows some commands but exam is somewhat limited by patient not wanting to participate, may be secondary to pain?     Cranial Nerves  CN II: Vision test: Partial gaze palsy to the right. Partial gaze palsy to the right.  CN III, IV, VI: Normal lids and orbits bilaterally. Pupils equal round and reactive to light bilaterally.  CN V:  Left: Diminished sensation of the entire left side of the face.  CN VII:  Left: Left facial droop.  CN VIII: Hearing is normal.  CN IX,  X: Palate elevates symmetrically       Motor   LUE 4/5, RUE 4/5   Mild drift in BLE.      Sensory  Light touch abnormality: Left face.      Reflexes                                           Right                      Left  Plantar                           Downgoing                Downgoing     Coordination  No obvious dysmetria      Gait  Not assessed.        Physical Exam   Constitutional: cachectic, chronically ill appearing     HENT:   Head: Normocephalic and atraumatic.   Neck: Normal range of motion.   Cardiovascular: Normal rate and regular rhythm.   Pulmonary/Chest: Effort normal and breath sounds normal.   Abdominal: Soft.   Skin: Skin is warm and dry.   Psych: calm     Results Review:    I reviewed the patient's new clinical results.    Lab Results (last 24 hours)     Procedure Component Value Units Date/Time    POC Glucose Once [[id number]]  (Abnormal) Collected:  08/17/20 2120    Specimen:  Blood Updated:  08/17/20 2122     Glucose 137 mg/dL     P2Y12 Platelet Inhibition [[id number]] Collected:  08/17/20 1243    Specimen:  Blood Updated:  08/17/20 1344     P2Y12 Reactivity Unit 82 PRU     Narrative:       P2Y12 Interpretation:  Pre-Drug normal reference range is 194-418 PRU.  Test results are reported in P2Y12 reaction units (PRU). This measures the extent of platelet aggregation in the presence of P2Y12 inhibitor drugs, such as clopidogrel (Plavix), prasugrel (Effient), ticagrelor (Brilinta), ticlopidine (Ticlid).  P2Y12 values <194 PRU (low end of reference range) are specific evidence of a P2Y12 inhibitor effect.  Patients who have been treated with Glycoprotein IIb/IIIa inhibitors should not be tested until platelet function has recovered. This time period is approximately 14 days after discontinuation of abciximab (ReoPro) and up to 48 hours after discontinuation of eptifibatide (Integrilin) and tirofiban (Aggrastat).   The P2Y12 test results should be interpreted in conjunction with other clinical  and lab data available to the clinician.        Ct Angiogram Neck    Result Date: 8/16/2020  1. There is somewhat irregular calcified plaque at the right carotid bifurcation with about 50% diameter stenosis by NASCET criteria. 2. There is been a previous left-sided carotid endarterectomy. There is less than 10% diameter stenosis at the distal end of the endarterectomy. 3. Both vertebral arteries are patent and codominant. Likely mild to moderate stenosis near their origins due to calcified plaque. Both supply the basilar. 4. There is no intracranial vascular cut off or focal central stenosis. There is an irregular appearance to the more distal left posterior cerebral artery distribution and more distal right MCA territory, probably due to intracranial atherosclerotic disease with vasculitis as the main differential consideration. 5. Likely tiny infundibuli origins of tiny posterior communicating arteries bilaterally. 5. Great vessel origins from the arch are not well included in the field-of-view. Likely some narrowing at the proximal right and left common carotid arteries. Signer Name: Alexandra Alfaro MD  Signed: 8/16/2020 10:09 PM  Workstation Name: [email or web address]  [facility]    Ct Lumbar Spine Without Contrast    Result Date: 8/17/2020  T12 compression deformity, strictly age-indeterminate with minimal bony retropulsion not producing significant associated spinal canal narrowing. Additional vertebral body superior endplate deformity at L4 likely related to adjacent discogenic degenerative changes. No definite CT bony findings to suggest underlying metastatic disease, however CT is insensitive and MRI is recommended if there is further concern for spinal metastatic disease.  Superimposed lumbar spondylosis with likely moderate to severe spinal canal narrowing and neuroforaminal stenosis at L4-L5.   This report was finalized on 8/17/2020 12:04 PM by Hi Tomas.      Mri Brain Without  Contrast    Result Date: 8/17/2020  Focal area of restricted diffusion involving the right hemipons concerning for acute infarct. No associated significant mass effect or hemorrhage.  Superimposed moderate periventricular white matter changes which are likely related to chronic small vessel ischemic change.   This report was finalized on 8/17/2020 7:56 AM by Hi Tomas.      Fl Video Swallow With Speech Single Contrast    Result Date: 8/17/2020  Fluoroscopy provided for a modified barium swallow. Please see speech therapy report for full details and recommendations.    This report was finalized on 8/17/2020 3:09 PM by Hi Tomas.      Xr Chest 1 View    Result Date: 8/16/2020  No acute cardiopulmonary findings. Signer Name: Rishi Pride MD  Signed: 8/16/2020 10:02 PM  Workstation Name: [facility]  [facility]    Ct Angiogram Head    Result Date: 8/16/2020  1. There is somewhat irregular calcified plaque at the right carotid bifurcation with about 50% diameter stenosis by NASCET criteria. 2. There is been a previous left-sided carotid endarterectomy. There is less than 10% diameter stenosis at the distal end of the endarterectomy. 3. Both vertebral arteries are patent and codominant. Likely mild to moderate stenosis near their origins due to calcified plaque. Both supply the basilar. 4. There is no intracranial vascular cut off or focal central stenosis. There is an irregular appearance to the more distal left posterior cerebral artery distribution and more distal right MCA territory, probably due to intracranial atherosclerotic disease with vasculitis as the main differential consideration. 5. Likely tiny infundibuli origins of tiny posterior communicating arteries bilaterally. 5. Great vessel origins from the arch are not well included in the field-of-view. Likely some narrowing at the proximal right and left common carotid arteries. Signer Name: Alexandra Alfaro MD  Signed:  8/16/2020 10:09 PM  Workstation Name: [email or web address]  [facility]    Ct Cerebral Perfusion With & Without Contrast    Result Date: 8/16/2020  Essentially normal CT brain perfusion. Signer Name: Alexandra Alfaro MD  Signed: 8/16/2020 9:35 PM  Workstation Name: [email or web address]  [facility]    Results for orders placed during the hospital encounter of 08/16/20   Adult Transthoracic Echo Complete W/ Cont if Necessary Per Protocol (With Agitated Saline)    Narrative · Cardiac chamber sizes and wall thicknesses are normal.  · Global and segmental left ventricular systolic function is normal. The   estimated left ventricular ejection fraction 66% - 70%.  · Aortic valve sclerosis is seen. The valve is functionally normal.  · Atrial valve is structurally and functionally normal.  · There is a small (less than 1 cm) circumferential pericardial effusion.                Assessment/Plan     Assessment/Plan:  75-year-old female who has a poor functional status at baseline from a previous stroke 1 year ago and significant vascular risk factors takes only Plavix and now presented with left facial droop, difficulty swallowing, left arm and left leg weakness.       Diagnostics- I personally reviewed all the imaging and labs  -CT head-mild atrophy  -CT head and neck, codominant vertebral arteries, leftward calcification at the origin, right V4 stenosis short segment, left V3 V4 calcification.  Left carotid complex heterogeneous plaque.  Right carotid plaque, right ICA complex heterogeneous plaque.  -MRI brain-  right subacute pontine infarct, questionable right cerebellar infarct.  Subcortical white spread T2 flair hyperintensities suggestive small vessel ischemia .  -Transthoracic echo- EF 66-70%, no PFO. There is a small (less than 1 cm) circumferential pericardial effusion  -carotid ultrasound-pending   -LDL 79, A1c of 5.1  -P2 Y2- 82, good response      #1  a. Acute to subacute ischemic stroke,  right pontine, right cerebellar likely etiology, large vessel atherosclerosis  Bilateral vert calcification could be the source,as an artery to artery emboli causing pontine and cerebellar stroke.  Patient has significant carotid atherosclerotic disease.  Since her MRS 4 from a previous stroke, she is not a candidate for intervention.  I recommend to  repeat CTA in 3 months.          b. Stroke secondary prevention- continue Aspirin and Plavix          c. Stroke recovery- Activity, PT/OT/Speech- PT recommends inpatient rehab at discharge                      #2 Hypertension-normalize blood pressure goals  #3  COPD- continue home regimen  #4 Hyperlipidemia- goal less than < 70.  Lipitor 40  #5 Tobacco abuse- encouraged cessation   #6 Low back pain, reportedly chronic  -Patient with elevated WBC, ESR and CRP  -CT lumbar spine shows L4-5 canal narrowing. Per daughter patient has had 2 spine surgeries in the past.     -pursue MRI of lumbar spine and consider neurosurgical consult after discussion with daughter Gabby    -Continue pain control per hospitalist team       The case and test results were discussed with the patient/ family and with admitting/primary physician taking care in the hospital and recommended further diagnostics and management plans in a collaborative fashion.            Sarah Haque, APRN  08/18/20  08:44

## 2020-08-18 NOTE — PROGRESS NOTES
[facility] Medicine Services  PROGRESS NOTE    Patient Name: Morelia Madden  : 1945  MRN: [id number]    Date of Admission: 2020  Primary Care Physician: Samantha Oseguera MD    Subjective   Subjective     CC: Follow-up dysarthria    HPI:  Endorses that her dysarthric speech is not baseline.  Having low back pain which is chronic but says she does not take any meds for it at home.    Review of Systems  Unable to obtain complete ROS secondary to dysarthria  RN notes that back pain has been the only complaint.     Objective   Objective     Vital Signs:   Temp:  [97.8 °F (36.6 °C)-98.4 °F (36.9 °C)] 98.2 °F (36.8 °C)  Heart Rate:  [] 112  Resp:  [14-18] 14  BP: (157-184)/(69-98) 184/90  Total (NIH Stroke Scale): 5     Physical Exam:  Constitutional: Chronically ill-appearing elderly lady no acute distress, awake, alert, moving in bed, seems a bit anxious  HENT: NCAT, mucous membranes moist  Respiratory: Clear to auscultation bilaterally, respiratory effort normal   Cardiovascular: RRR, no murmurs, rubs, or gallops, palpable pedal pulses bilaterally  Gastrointestinal: Positive bowel sounds, soft, nontender, nondistended  Musculoskeletal: No bilateral ankle edema  Psychiatric: Appropriate affect, cooperative  Neurologic: Oriented x 3, dysarthria, right facial droop, right-sided weakness.  Speech is quite difficult to understand.   Skin: No rashes    Results Reviewed:  Results from last 7 days   Lab Units 20   WBC 10*3/mm3 13.12*  --    HEMOGLOBIN g/dL 11.8*  --    HEMATOCRIT % 36.9  --    PLATELETS 10*3/mm3 459*  --    INR   --  0.99   PROCALCITONIN ng/mL 3.24*  --      Results from last 7 days   Lab Units 20   SODIUM mmol/L 138   POTASSIUM mmol/L 3.8   CHLORIDE mmol/L 104   CO2 mmol/L 21.0*   BUN mg/dL 18   CREATININE mg/dL 0.58   GLUCOSE mg/dL 106*   CALCIUM mg/dL 9.8   ALT (SGPT) U/L 7   AST (SGOT) U/L 14   TROPONIN T ng/mL  <0.010   PROBNP pg/mL 2,005.0*     Estimated Creatinine Clearance: 40.4 mL/min (by C-G formula based on SCr of 0.58 mg/dL).    Microbiology Results Abnormal     Procedure Component Value - Date/Time    COVID PRE-OP / PRE-PROCEDURE SCREENING ORDER (NO ISOLATION) - Swab, Nasopharynx [[id number]] Collected:  08/17/20 0051    Lab Status:  Final result Specimen:  Swab from Nasopharynx Updated:  08/17/20 0306    Narrative:       The following orders were created for panel order COVID PRE-OP / PRE-PROCEDURE SCREENING ORDER (NO ISOLATION) - Swab, Nasopharynx.  Procedure                               Abnormality         Status                     ---------                               -----------         ------                     Respiratory Panel PCR w/...[[id number]]  Normal              Final result                 Please view results for these tests on the individual orders.    Respiratory Panel PCR w/COVID-19(SARS-CoV-2) OLGA/HERMAN/RICHARD/PAD In-House, NP Swab in UTM/VTM, 3-4 HR TAT - Swab, Nasopharynx [[id number]]  (Normal) Collected:  08/17/20 0051    Lab Status:  Final result Specimen:  Swab from Nasopharynx Updated:  08/17/20 0306     ADENOVIRUS, PCR Not Detected     Coronavirus 229E Not Detected     Coronavirus HKU1 Not Detected     Coronavirus NL63 Not Detected     Coronavirus OC43 Not Detected     COVID19 Not Detected     Human Metapneumovirus Not Detected     Human Rhinovirus/Enterovirus Not Detected     Influenza A PCR Not Detected     Influenza A H1 Not Detected     Influenza A H1 2009 PCR Not Detected     Influenza A H3 Not Detected     Influenza B PCR Not Detected     Parainfluenza Virus 1 Not Detected     Parainfluenza Virus 2 Not Detected     Parainfluenza Virus 3 Not Detected     Parainfluenza Virus 4 Not Detected     RSV, PCR Not Detected     Bordetella pertussis pcr Not Detected     Bordetella parapertussis PCR Not Detected     Chlamydophila pneumoniae PCR Not Detected     Mycoplasma pneumo by PCR Not Detected     Narrative:       Fact sheet for providers: https://docs.[employer]/wp-content/uploads/UPO9151-8558-LN1.1-EUA-Provider-Fact-Sheet-3.pdf    Fact sheet for patients: https://docs.[employer]/wp-content/uploads/JXY6980-1180-GS2.1-EUA-Patient-Fact-Sheet-1.pdf          Imaging Results (Last 24 Hours)     Procedure Component Value Units Date/Time    FL Video Swallow With Speech Single Contrast [[id number]] Collected:  08/17/20 1441     Updated:  08/17/20 1512    Narrative:       EXAMINATION: FL VIDEO SWALLOW W SPEECH SINGLE-CONTRAST-     INDICATION: dysphagia; R47.1-Dysarthria and anarthria; R13.10-Dysphagia,  unspecified     TECHNIQUE: 1 minute and 54 seconds of fluoroscopic time was used for  this exam. 10 associated fluoroscopic loops were saved. The patient was  evaluated in the seated lateral position while taking a variety of  consistencies of barium by mouth under the direction of speech  pathology.     COMPARISON: NONE     FINDINGS: 1 minute and 54 seconds of fluoroscopy provided for a modified  barium swallow. Please see speech therapy report for full details and  recommendations.          Impression:       Fluoroscopy provided for a modified barium swallow. Please  see speech therapy report for full details and recommendations.         This report was finalized on 8/17/2020 3:09 PM by Hi Tomas.       CT Lumbar Spine Without Contrast [[id number]] Collected:  08/17/20 1200     Updated:  08/17/20 1207    Narrative:       EXAMINATION: CT LUMBAR SPINE WO CONTRAST-      INDICATION: Low back pain, cancer or infection suspected     TECHNIQUE: Axial noncontrast CT of the lumbar spine with multiplanar  reconstructions     The radiation dose reduction device was turned on for each scan per the  ALARA (As Low as Reasonably Achievable) protocol.     COMPARISON: NONE     FINDINGS: There is a T12 vertebral body compression deformity with  around 75% loss of height and mild bony retropulsion. Likely  mild  associated spinal canal narrowing at this level as a result. In the  absence of comparison, acuity is difficult to determine. No paraspinal  hematoma or other acute complications. Diffuse demineralization is noted  elsewhere. There is also central loss of vertebral body height at L4,  likely related to an adjacent Schmorl's node complex. No significant  retropulsion at this level. Superimposed areas of moderate to severe  lumbar spondylosis are noted and likely most severe at L4-L5 where there  appears to be moderate or severe spinal canal and bilateral  neuroforaminal narrowing. Evaluation of the paraspinal soft tissues  demonstrates moderate to severe aortoiliac atherosclerosis. There is  also contrast retained in the kidneys bilaterally.       Impression:       T12 compression deformity, strictly age-indeterminate with  minimal bony retropulsion not producing significant associated spinal  canal narrowing. Additional vertebral body superior endplate deformity  at L4 likely related to adjacent discogenic degenerative changes. No  definite CT bony findings to suggest underlying metastatic disease,  however CT is insensitive and MRI is recommended if there is further  concern for spinal metastatic disease.     Superimposed lumbar spondylosis with likely moderate to severe spinal  canal narrowing and neuroforaminal stenosis at L4-L5.        This report was finalized on 8/17/2020 12:04 PM by Hi Tomas.             Results for orders placed during the hospital encounter of 08/16/20   Adult Transthoracic Echo Complete W/ Cont if Necessary Per Protocol (With Agitated Saline)    Narrative · Cardiac chamber sizes and wall thicknesses are normal.  · Global and segmental left ventricular systolic function is normal. The   estimated left ventricular ejection fraction 66% - 70%.  · Aortic valve sclerosis is seen. The valve is functionally normal.  · Atrial valve is structurally and functionally normal.  · There is  a small (less than 1 cm) circumferential pericardial effusion.          I have reviewed the medications:  Scheduled Meds:    aspirin 81 mg Oral Daily   Or      aspirin 300 mg Rectal Daily   atorvastatin 80 mg Oral Nightly   citalopram 10 mg Oral Nightly   clopidogrel 75 mg Oral Daily   diclofenac 2 g Topical 4x Daily   levothyroxine 100 mcg Oral Q AM   LORazepam 0.25 mg Intravenous Once   pantoprazole 40 mg Intravenous Q AM   sodium chloride 10 mL Intravenous Q12H   sodium chloride 10 mL Intravenous Q12H     Continuous Infusions:   PRN Meds:.•  acetaminophen **OR** acetaminophen **OR** acetaminophen  •  docusate sodium  •  ipratropium-albuterol  •  metoprolol tartrate  •  ondansetron **OR** ondansetron  •  sodium chloride  •  sodium chloride    Assessment/Plan   Assessment & Plan     Active Hospital Problems    Diagnosis  POA   • **Stroke (cerebrum) (CMS/Formerly [facility]) [I63.9]  Yes   • Essential hypertension [I10]  Unknown   • GERD without esophagitis [K21.9]  Unknown   • COPD (chronic obstructive pulmonary disease) (CMS/Formerly [facility]) [J44.9]  Unknown   • Hypothyroidism (acquired) [E03.9]  Unknown   • Hyperlipemia [E78.5]  Unknown      Resolved Hospital Problems   No resolved problems to display.        Brief Hospital Course to date:  Morelia Madden is a 75 y.o. female with past medical history of hyperlipidemia, hypothyroidism, COPD, GERD, hypertension.  Transferred from OSH after presenting with right facial droop, dysarthria and expressive aphasia, admitted for likely CVA/TIA.    Plan  CVA R hemipons  -Patient with right-sided facial droop, dysarthria and expressive aphasia  --Continue aspirin, Plavix, statin  - SLP, PT/OT to evaluate    Low back and leg pain  -Patient with elevated WBC, ESR and CRP  -CT lumbar spine shows L4-5 canal narrowing.  Pt has been bedbound nad has a history of noncompliance (left rehab after a day, did not open door for home health, etc).  Consider MRI and NSurg input but will await neuro opinion and GOC  discussion.    -Continue pain control - adding percocet today     Hypertension-BP currently well controlled, continue home meds of hydralazine, Norvasc and metoprolol    Hyperlipidemia-high-dose statin in place    COPD with ongoing tobacco abuse, not in exacerbation  -Extensive counseled on cessation,    Hypothyroidism-continue Synthroid    DVT Prophylaxis: Mechanical    Disposition: TBD.  History of leaving a SNF AMA and being discharged by HH for noncompliance.  Bedbound and not interested in walking. Prob home at [place]. However, dtr Gabby reportedly told  that home care is getting too hard.      CODE STATUS:   Code Status and Medical Interventions:   Ordered at: 08/16/20 2055     Level Of Support Discussed With:    Patient     Code Status:    CPR     Medical Interventions (Level of Support Prior to Arrest):    Full       Electronically signed by Sophia Brar MD, 08/18/20, 09:20.

## 2020-08-18 NOTE — PROGRESS NOTES
Continued Stay Note  [facility]     Patient Name: Morelia Madden  MRN: [id number]  Today's Date: 8/18/2020    Admit Date: 8/16/2020    Discharge Plan     Row Name 08/18/20 1138       Plan    Plan Comments  CM spoke with pts daughter and daughter-in-law who have both reported pt is able to return home with Gabby. Gabby had originally thought that because pt had a stroke she would be unable to take care of her but afer speaking with the nurse and pt she believes she can continue to care for pt. At this time she is denying any discharge needs.        Discharge Codes    No documentation.       Expected Discharge Date and Time     Expected Discharge Date Expected Discharge Time    Aug 20, 2020             Cydney Coates RN

## 2020-08-18 NOTE — PROGRESS NOTES
"                  Clinical Nutrition     Reason for Visit:   Identified at risk by screening criteria    Patient Name: Morelia Madden  YOB: 1945  MRN: [id number]  Date of Encounter: 08/18/20 12:31  Admission date: 8/16/2020    Nutrition Assessment   Assessment     Admission diagnosis  Acute CVA, R hemipons    Additional diagnosis/conditions/procedures this admission  Expressive aphasia  Dysarthria  Low back pain/leg pain    (8/17) SLP MBS - puree, honey thick liquids    Additional PMH/procedures:  HTN  HLP  COPD  GIB  GERD  Hypothyroid  COPD    Reported/Observed/Food/Nutrition Related History:      Patient with significant expressive aphasia. Mostly nodded head to questions. Tolerating current modified diet. Supplements added to assist with oral intake.       Anthropometrics     Height: 152.4 cm (60\")  Last filed wt: Weight: 43.5 kg (96 lb) (08/18/20 0600)  Weight Method: Bed scale    BMI: BMI (Calculated): 18.7  Normal: 18.5-24.9kg/m2    Ideal Body Weight (IBW) (kg): 45.86  Admission wt: 82 lb 11.2 oz  Method obtained: bed scale weight per charting 8/16    Labs reviewed     Results from last 7 days   Lab Units 08/16/20  2131   GLUCOSE mg/dL 106*   BUN mg/dL 18   CREATININE mg/dL 0.58   SODIUM mmol/L 138   CHLORIDE mmol/L 104   POTASSIUM mmol/L 3.8   MAGNESIUM mg/dL 1.9   ALT (SGPT) U/L 7     Results from last 7 days   Lab Units 08/17/20  0449 08/16/20  2131   ALBUMIN g/dL  --  3.80   CRP mg/dL 7.14*  --    CHOLESTEROL mg/dL 145  --    TRIGLYCERIDES mg/dL 100  --        Results from last 7 days   Lab Units 08/17/20  2120 08/17/20  0046 08/16/20  2133   GLUCOSE mg/dL 137* 103 114     Lab Results   Lab Value Date/Time    HGBA1C 5.10 08/17/2020 0449       Medications reviewed   Pertinent: plavix, protonix      Intake/Output 24 hrs (7:00AM - 6:59 AM)     Intake & Output (last day)       08/17 0701 - 08/18 0700 08/18 0701 - 08/19 0700    P.O. 240     Total Intake(mL/kg) 240 (5.5)     Urine (mL/kg/hr) 400 " (0.4)     Total Output 400     Net -160           Urine Unmeasured Occurrence 2 x           Current Nutrition Prescription     PO: Diet Dysphagia; II - Pureed; Honey Thick; No Straws  Intake: insuff data - 25% x 1 meal    Nutrition Diagnosis     8/18  Problem Predicted suboptimal energy intake   Etiology Modified diet/dysphagia   Signs/Symptoms RN report of poor appetite on modified diet     8/18  Problem Swallowing difficulty   Etiology Dysphagia   Signs/Symptoms SLP eval 8/17 rec, puree/HTL       Nutrition Intervention     1.  Follow treatment progress, Care plan reviewed  2.  Advise alternate selection on modified diet  3. Supplement ordered - Magic Cup x2/d  4. Encouraged oral intake    Goal:   General: Nutrition support treatment  PO: Establish PO, Increase intake, Advace diet as medically feasible/appropriate      Monitoring/Evaluation:   Per protocol, PO intake, Supplement intake, Pertinent labs, Symptoms, POC/GOC, Swallow function    Will Continue to follow per protocol    Debra Ball RDN, LD  Time Spent: 30 minutes

## 2020-08-19 PROCEDURE — 99232 SBSQ HOSP IP/OBS MODERATE 35: CPT | Performed by: INTERNAL MEDICINE

## 2020-08-19 PROCEDURE — 99231 SBSQ HOSP IP/OBS SF/LOW 25: CPT | Performed by: NURSE PRACTITIONER

## 2020-08-19 PROCEDURE — 99232 SBSQ HOSP IP/OBS MODERATE 35: CPT | Performed by: PHYSICIAN ASSISTANT

## 2020-08-19 RX ORDER — DOCUSATE SODIUM 50 MG/5 ML
100 LIQUID (ML) ORAL 2 TIMES DAILY
Status: DISCONTINUED | OUTPATIENT
Start: 2020-08-19 | End: 2020-08-20 | Stop reason: HOSPADM

## 2020-08-19 RX ADMIN — DICLOFENAC 2 G: 10 GEL TOPICAL at 08:11

## 2020-08-19 RX ADMIN — DICLOFENAC 2 G: 10 GEL TOPICAL at 11:47

## 2020-08-19 RX ADMIN — PANTOPRAZOLE SODIUM 40 MG: 40 INJECTION, POWDER, FOR SOLUTION INTRAVENOUS at 06:22

## 2020-08-19 RX ADMIN — ATORVASTATIN CALCIUM 80 MG: 40 TABLET, FILM COATED ORAL at 20:48

## 2020-08-19 RX ADMIN — CITALOPRAM HYDROBROMIDE 10 MG: 20 TABLET ORAL at 20:48

## 2020-08-19 RX ADMIN — DICLOFENAC 2 G: 10 GEL TOPICAL at 17:33

## 2020-08-19 RX ADMIN — ACETAMINOPHEN ORAL SOLUTION 650 MG: 650 SOLUTION ORAL at 17:39

## 2020-08-19 RX ADMIN — PHENOL 2 SPRAY: 1.5 LIQUID ORAL at 17:33

## 2020-08-19 RX ADMIN — ACETAMINOPHEN 650 MG: 325 TABLET, FILM COATED ORAL at 12:26

## 2020-08-19 RX ADMIN — LEVOTHYROXINE SODIUM 100 MCG: 100 TABLET ORAL at 06:22

## 2020-08-19 RX ADMIN — SODIUM CHLORIDE, PRESERVATIVE FREE 10 ML: 5 INJECTION INTRAVENOUS at 08:11

## 2020-08-19 RX ADMIN — DICLOFENAC 2 G: 10 GEL TOPICAL at 20:39

## 2020-08-19 RX ADMIN — SODIUM CHLORIDE, PRESERVATIVE FREE 10 ML: 5 INJECTION INTRAVENOUS at 20:39

## 2020-08-19 RX ADMIN — OXYCODONE AND ACETAMINOPHEN 1 TABLET: 5; 325 TABLET ORAL at 06:22

## 2020-08-19 RX ADMIN — ACETAMINOPHEN 650 MG: 325 TABLET, FILM COATED ORAL at 08:11

## 2020-08-19 RX ADMIN — DOCUSATE SODIUM 100 MG: 50 LIQUID ORAL at 11:46

## 2020-08-19 RX ADMIN — OXYCODONE AND ACETAMINOPHEN 1 TABLET: 5; 325 TABLET ORAL at 15:04

## 2020-08-19 RX ADMIN — ACETAMINOPHEN ORAL SOLUTION 650 MG: 650 SOLUTION ORAL at 00:28

## 2020-08-19 RX ADMIN — CLOPIDOGREL BISULFATE 75 MG: 75 TABLET ORAL at 08:11

## 2020-08-19 RX ADMIN — METOROPROLOL TARTRATE 2.5 MG: 5 INJECTION, SOLUTION INTRAVENOUS at 11:46

## 2020-08-19 RX ADMIN — DOCUSATE SODIUM 100 MG: 50 LIQUID ORAL at 20:39

## 2020-08-19 RX ADMIN — DOCUSATE SODIUM 100 MG: 100 CAPSULE, LIQUID FILLED ORAL at 08:11

## 2020-08-19 RX ADMIN — ASPIRIN 81 MG: 81 TABLET, CHEWABLE ORAL at 08:11

## 2020-08-19 NOTE — PLAN OF CARE
Patient was seen today and was offered a sacralplasty due to her sacral insufficiency fractures.  We discussed this treatment plan with patient and patient's daughter-in-law (power of ), which at that time they asked for time to discuss if they wanted to proceed.  We have been notified by the nurse, that they have decided at this time that they will defer any treatment options.  Therefore, we will sign off at this time.  Please reconsult if the there is a change in plans.     Hanna Solares PA-C

## 2020-08-19 NOTE — PROGRESS NOTES
Continued Stay Note  [facility]     Patient Name: Morelia Madden  MRN: [id number]  Today's Date: 8/19/2020    Admit Date: 8/16/2020    Discharge Plan     Row Name 08/19/20 1513       Plan    Plan Comments  CM has spoken with pts daughter-in-law who is pts POA. She confirms they will be taking pt home at discharge and that she, her , and her daughter will be available 24/7 to assist in her care as needed. She is agreeable to  but does report pt has a history of refusing to participate or refusing to answer the door when they are not there. CM will continue to follow.        Discharge Codes    No documentation.       Expected Discharge Date and Time     Expected Discharge Date Expected Discharge Time    Aug 20, 2020             Cydney Coates RN

## 2020-08-19 NOTE — CONSULTS
NAME: DARIN SANCHEZ  : 1945  PCP: Samantha Oseguera MD  Attending MD: Martha Gallegos DO    Date of Admission:  2020  Date of Service: 2020    History of Present Illness:  75 y.o.  female who is seen today in consult due to low back pain, with MRI of pelvis demonstrating sacral insufficiency fractures.  Patient's family notes a prior past medical history of a CVA 1 year prior, hypertension, hyperlipidemia, COPD, chronic low back pain (been offered prior surgery but denied) and history of multiple falls.  Patient presented to [facility] via transfer due to complaints of dysarthria, expressive aphasia, left facial droop, and difficulty swallowing.  Patient lives with her mentally disabled daughter, who helps her with her activities of daily living.  It is also noted that patient has a pain medication addiction and pain is managed with tramadol due to the issues with addiction.    Patient is a poor historian.  However, patient's daughter-in-law notes that patient had started experiencing several falls in the last few years.  She notes that she is able to walk with a walker, however, patient avoids due to the fear of falling.  She states the patient has had chronic low back pain for several years.  In addition, she has been evaluated prior by neurosurgery, which noted that they could perform a surgery, however, noted that she had a 30% survival rate.  In general, patient has stated to family that she does not want to proceed with any back surgeries.    Past Medical History:  No past medical history on file.    Past Surgical History:  No past surgical history on file.      Medications  Medications Prior to Admission   Medication Sig Dispense Refill Last Dose   • citalopram (CeleXA) 10 MG tablet Take 10 mg by mouth Every Night.      • clopidogrel (PLAVIX) 75 MG tablet Take 75 mg by mouth Daily.      • hydrALAZINE (APRESOLINE) 25 MG tablet Take 25 mg by mouth 3 (Three) Times a Day.       • levothyroxine (SYNTHROID, LEVOTHROID) 100 MCG tablet Take 100 mcg by mouth Daily.      • montelukast (SINGULAIR) 10 MG tablet Take 10 mg by mouth Every Night.      • pantoprazole (PROTONIX) 40 MG EC tablet Take 40 mg by mouth 2 (two) times a day.      • promethazine (PHENERGAN) 12.5 MG tablet Take 12.5 mg by mouth Every 6 (Six) Hours As Needed for Nausea or Vomiting.      • simvastatin (ZOCOR) 20 MG tablet Take 20 mg by mouth Every Night.      • vitamin D (ERGOCALCIFEROL) 1.25 MG (07419 UT) capsule capsule Take 50,000 Units by mouth 1 (One) Time Per Week.          Allergies:  Allergies   Allergen Reactions   • Flomax [Tamsulosin Hcl] Unknown - Low Severity   • Valsartan Unknown - Low Severity       Social Hx:  Social History     Socioeconomic History   • Marital status:      Spouse name: Not on file   • Number of children: Not on file   • Years of education: Not on file   • Highest education level: Not on file   Tobacco Use   • Smoking status: Current Every Day Smoker   • Smokeless tobacco: Never Used       Family Hx:  No family history on file.    Review of Imaging:  MRI of the lumbar spine was reviewed along with its corresponding radiology report.  Study demonstrates associated edema and irregularities of the cortical margins, bilateral sacral bodies, with right greater involvement than left, and appearance of sacral insufficiency fractures.  There is also evidence of chronic compression deformities at T12 and L4.    Laboratory Result:  Lab Results   Component Value Date    WBC 13.12 (H) 08/16/2020    HGB 11.8 (L) 08/16/2020    HCT 36.9 08/16/2020    MCV 96.9 08/16/2020     (H) 08/16/2020     Lab Results   Component Value Date    GLUCOSE 106 (H) 08/16/2020    CALCIUM 9.8 08/16/2020     08/16/2020    K 3.8 08/16/2020    CO2 21.0 (L) 08/16/2020     08/16/2020    BUN 18 08/16/2020    CREATININE 0.58 08/16/2020    EGFRIFNONA 101 08/16/2020    BCR 31.0 (H) 08/16/2020    ANIONGAP 13.0  08/16/2020     Lab Results   Component Value Date    HGBA1C 5.10 08/17/2020     Lab Results   Component Value Date    CHOL 145 08/17/2020    TRIG 100 08/17/2020    HDL 46 08/17/2020    LDL 79 08/17/2020       Physical Examination:  Vitals:    08/19/20 1047   BP: (!) 193/106   Pulse: 97   Resp: 16   Temp: 98.1 °F (36.7 °C)   SpO2: 97%      Patient is lying in bed.  Patient has dysarthria present.  Is able to answer simple questions.  She appears distressed and slightly disheveled.    She is able to demonstrate antigravity strength in the upper and lower extremities.  Patient has palpable pain at the low back region.        Diagnoses/Plan:    Ms. Madden is a 75 y.o. female who is seen today in consult due to low back pain.  After reviewing the most recent MRI of lumbar spine, patient has evidence of sacral insufficiency fractures.  Therefore, it was discussed with patient and daughter-in-law if they would be interested in proceeding with a sacral plasty.  Daughter-in-law stated that she will discuss with Ms. Madden if they would be interested in proceeding.  They plan to discuss this this afternoon/evening.  All risks and benefits were discussed with family.  No guarantees were made, specifically, noting that the patient will continue to most likely have back pain, liver, this will hopefully help alleviate some pain with ambulation.  In addition, it was stated that we will not provide the patient pain medication.  We will continue to follow pending the patient and family's decision if they decide to proceed with the sacral plasty.    Hanna Solares PA-C

## 2020-08-19 NOTE — PROGRESS NOTES
Stroke Progress Note       Chief Complaint:  Left facial droop    Subjective    Subjective     Subjective:  Resting in bed. Her speech is significantly dysarthric but able to answer some questions and is oriented.  Follows commands. Still commands of low back pain, had to be medicated through the night.     Review of Systems   Constitutional: looks fatigued  HENT: Negative for nosebleeds and rhinorrhea.    Eyes: Negative for redness.   Respiratory: Negative for cough.    Gastrointestinal: Negative for anal bleeding.   Endocrine: Negative for polydipsia.   Genitourinary: Negative for enuresis and urgency.   Musculoskeletal: back pain  Neurological: Negative for tremors.   Psychiatric/Behavioral: Negative for hallucinations.     Objective      Temp:  [97.3 °F (36.3 °C)-98.4 °F (36.9 °C)] 98.2 °F (36.8 °C)  Heart Rate:  [] 100  Resp:  [14-18] 16  BP: (151-184)/(79-97) 184/94    Neurological Exam  Mental Status  Awake. Oriented only to person and place. Severe dysarthria present. Expressive aphasia present. Can answer simple questions. Follows commands     Cranial Nerves  CN II: Vision test: Partial gaze palsy to the right. Partial gaze palsy to the right.  CN III, IV, VI: Normal lids and orbits bilaterally. Pupils equal round and reactive to light bilaterally.  CN V:  Left: Diminished sensation of the entire left side of the face.  CN VII:  Left: Left facial droop.  CN VIII: Hearing is normal.  CN IX, X: Palate elevates symmetrically       Motor   LUE 4/5, RUE 4/5   Mild drift in BLE.      Sensory  Light touch abnormality: Left face.      Reflexes                                           Right                      Left  Plantar                           Downgoing                Downgoing     Coordination  No obvious dysmetria      Gait  Not assessed.        Physical Exam   Constitutional: cachectic, chronically ill appearing     HENT:   Head: Normocephalic and atraumatic.   Neck: Normal range of motion.    Cardiovascular: Normal rate and regular rhythm.   Pulmonary/Chest: Effort normal and breath sounds normal.   Abdominal: Soft.   Skin: Skin is warm and dry.   Psych: calm     Results Review:    I reviewed the patient's new clinical results.    Lab Results (last 24 hours)     ** No results found for the last 24 hours. **        Mri Lumbar Spine Without Contrast    Result Date: 8/19/2020  1. Abnormal bone marrow signal of associated edema and irregularities of the cortical margins, bilateral sacral bodies, right greater involvement than left, of apparent sacral insufficiency fractures without evidence for SI joint widening. Given bone marrow edema favor acuity or recent involvement.  2. Chronic compression deformities at the T12 and L4 vertebral levels without retropulsion associated, detailed further above.  3.  Multilevel spondylitic changes without critical spinal canal stenosis greatest at the L5-S1 level, detailed on a level-by-level basis above in the findings.   D:  08/18/2020 E:  08/19/2020       Fl Video Swallow With Speech Single Contrast    Result Date: 8/17/2020  Fluoroscopy provided for a modified barium swallow. Please see speech therapy report for full details and recommendations.    This report was finalized on 8/17/2020 3:09 PM by Hi Tomas.      Results for orders placed during the hospital encounter of 08/16/20   Adult Transthoracic Echo Complete W/ Cont if Necessary Per Protocol (With Agitated Saline)    Narrative · Cardiac chamber sizes and wall thicknesses are normal.  · Global and segmental left ventricular systolic function is normal. The   estimated left ventricular ejection fraction 66% - 70%.  · Aortic valve sclerosis is seen. The valve is functionally normal.  · Atrial valve is structurally and functionally normal.  · There is a small (less than 1 cm) circumferential pericardial effusion.                Assessment/Plan     Assessment/Plan:  75-year-old female who has a poor  functional status at baseline from a previous stroke 1 year ago and significant vascular risk factors takes only Plavix and now presented with left facial droop, difficulty swallowing, left arm and left leg weakness.       Diagnostics- I personally reviewed all the imaging and labs  -CT head-mild atrophy  -CT head and neck, codominant vertebral arteries, leftward calcification at the origin, right V4 stenosis short segment, left V3 V4 calcification.  Left carotid complex heterogeneous plaque.  Right carotid plaque, right ICA complex heterogeneous plaque.  -MRI brain-  right subacute pontine infarct, questionable right cerebellar infarct.  Subcortical white spread T2 flair hyperintensities suggestive small vessel ischemia .  -Transthoracic echo- EF 66-70%, no PFO. There is a small (less than 1 cm) circumferential pericardial effusion  -carotid ultrasound- Right ICA Prox: Imaging of the right ICA indicates 50-69% stenosis   left ICA indicates 50-69% stenosis  -LDL 79, A1c of 5.1  -P2 Y2- 82, good response      #1  a. Acute to subacute ischemic stroke, right pontine, right cerebellar likely etiology, large vessel atherosclerosis  Bilateral vert calcification could be the source,as an artery to artery emboli causing pontine and cerebellar stroke.  Patient has significant carotid atherosclerotic disease.  Since her MRS 4 from a previous stroke, she is not a candidate for intervention. Repeat CTA in 3 months.          b. Stroke secondary prevention- continue Aspirin and Plavix          c. Stroke recovery- Activity, PT/OT/Speech- PT recommends inpatient rehab at discharge                      #2 Hypertension-normal blood pressure goals  #3  COPD- continue home regimen  #4 Hyperlipidemia- goal less than < 70.  Lipitor 40  #5 Tobacco abuse- encouraged cessation   #6 Low back pain  -MRI lumbar spine reviewed and had Dr Henderson review. Has bilateral sacral insufficiency fractures.  He will see for possible sacroplasty.      Stroke neurology will sign off. Repeat CTA in 3 months and fu with outpt neurology.    Kim Sorto, JANICE  08/19/20  09:16

## 2020-08-19 NOTE — PROGRESS NOTES
[facility] Medicine Services  PROGRESS NOTE    Patient Name: Morelia Madden  : 1945  MRN: [id number]    Date of Admission: 2020  Primary Care Physician: Samantha Oseguera MD    Subjective   Subjective     CC:  CVA, aphasia     HPI:  No acute events. Continues to low back and leg pain. Neurosurgical eval today. Called and updated daughter in law. States that patient has declined and been let go from home health twice before.     Review of Systems  Gen- No fevers, chills  CV- No chest pain, palpitations  Resp- No cough, dyspnea  GI- No N/V/D, abd pain     Objective   Objective     Vital Signs:   Temp:  [98 °F (36.7 °C)-98.4 °F (36.9 °C)] 98.1 °F (36.7 °C)  Heart Rate:  [] 89  Resp:  [14-18] 16  BP: (155-209)/() 209/112  Total (NIH Stroke Scale): 5     Physical Exam:  Constitutional: No acute distress, awake, alert, chronically ill appearing   HENT: NCAT, mucous membranes moist  Respiratory: Clear to auscultation bilaterally, respiratory effort normal   Cardiovascular: RRR, no murmurs, rubs, or gallops, palpable pedal pulses bilaterally  Gastrointestinal: Positive bowel sounds, soft, nontender, nondistended  Musculoskeletal: No bilateral ankle edema  Psychiatric: flat affect, cooperative  Neurologic:  Facial droop, Cranial Nerves grossly intact to confrontation, speech clear  Skin: No rashes    Results Reviewed:  Results from last 7 days   Lab Units 20   WBC 10*3/mm3 13.12*  --    HEMOGLOBIN g/dL 11.8*  --    HEMATOCRIT % 36.9  --    PLATELETS 10*3/mm3 459*  --    INR   --  0.99   PROCALCITONIN ng/mL 3.24*  --      Results from last 7 days   Lab Units 20   SODIUM mmol/L 138   POTASSIUM mmol/L 3.8   CHLORIDE mmol/L 104   CO2 mmol/L 21.0*   BUN mg/dL 18   CREATININE mg/dL 0.58   GLUCOSE mg/dL 106*   CALCIUM mg/dL 9.8   ALT (SGPT) U/L 7   AST (SGOT) U/L 14   TROPONIN T ng/mL <0.010   PROBNP pg/mL 2,005.0*     Estimated  Creatinine Clearance: 40.4 mL/min (by C-G formula based on SCr of 0.58 mg/dL).    Microbiology Results Abnormal     Procedure Component Value - Date/Time    COVID PRE-OP / PRE-PROCEDURE SCREENING ORDER (NO ISOLATION) - Swab, Nasopharynx [[id number]] Collected:  08/17/20 0051    Lab Status:  Final result Specimen:  Swab from Nasopharynx Updated:  08/17/20 0306    Narrative:       The following orders were created for panel order COVID PRE-OP / PRE-PROCEDURE SCREENING ORDER (NO ISOLATION) - Swab, Nasopharynx.  Procedure                               Abnormality         Status                     ---------                               -----------         ------                     Respiratory Panel PCR w/...[[id number]]  Normal              Final result                 Please view results for these tests on the individual orders.    Respiratory Panel PCR w/COVID-19(SARS-CoV-2) OLGA/HERMAN/RICHARD/PAD In-House, NP Swab in UTM/VTM, 3-4 HR TAT - Swab, Nasopharynx [[id number]]  (Normal) Collected:  08/17/20 0051    Lab Status:  Final result Specimen:  Swab from Nasopharynx Updated:  08/17/20 0306     ADENOVIRUS, PCR Not Detected     Coronavirus 229E Not Detected     Coronavirus HKU1 Not Detected     Coronavirus NL63 Not Detected     Coronavirus OC43 Not Detected     COVID19 Not Detected     Human Metapneumovirus Not Detected     Human Rhinovirus/Enterovirus Not Detected     Influenza A PCR Not Detected     Influenza A H1 Not Detected     Influenza A H1 2009 PCR Not Detected     Influenza A H3 Not Detected     Influenza B PCR Not Detected     Parainfluenza Virus 1 Not Detected     Parainfluenza Virus 2 Not Detected     Parainfluenza Virus 3 Not Detected     Parainfluenza Virus 4 Not Detected     RSV, PCR Not Detected     Bordetella pertussis pcr Not Detected     Bordetella parapertussis PCR Not Detected     Chlamydophila pneumoniae PCR Not Detected     Mycoplasma pneumo by PCR Not Detected    Narrative:       Fact sheet for  providers: https://docs.[employer]/wp-content/uploads/SSU1999-6542-QY0.1-EUA-Provider-Fact-Sheet-3.pdf    Fact sheet for patients: https://docs.[employer]/wp-content/uploads/XHN8285-1461-WG7.1-EUA-Patient-Fact-Sheet-1.pdf          Imaging Results (Last 24 Hours)     Procedure Component Value Units Date/Time    CT Lumbar Spine Without Contrast [[id number]] Collected:  08/17/20 1200     Updated:  08/19/20 0910    Addenda:        Addendum: There are subtle areas of cortical irregularity involving  bilateral aspects of the sacrum concerning for areas of sacral  insufficiency fracture. These findings are also noted on subsequently  performed MRI of the lumbar spine.     This report was finalized on 8/19/2020 9:07 AM by Hi Tomas.     Signed:  08/19/20 0907 by Hi Tomas MD    Narrative:       EXAMINATION: CT LUMBAR SPINE WO CONTRAST-      INDICATION: Low back pain, cancer or infection suspected     TECHNIQUE: Axial noncontrast CT of the lumbar spine with multiplanar  reconstructions     The radiation dose reduction device was turned on for each scan per the  ALARA (As Low as Reasonably Achievable) protocol.     COMPARISON: NONE     FINDINGS: There is a T12 vertebral body compression deformity with  around 75% loss of height and mild bony retropulsion. Likely mild  associated spinal canal narrowing at this level as a result. In the  absence of comparison, acuity is difficult to determine. No paraspinal  hematoma or other acute complications. Diffuse demineralization is noted  elsewhere. There is also central loss of vertebral body height at L4,  likely related to an adjacent Schmorl's node complex. No significant  retropulsion at this level. Superimposed areas of moderate to severe  lumbar spondylosis are noted and likely most severe at L4-L5 where there  appears to be moderate or severe spinal canal and bilateral  neuroforaminal narrowing. Evaluation of the paraspinal soft tissues  demonstrates  moderate to severe aortoiliac atherosclerosis. There is  also contrast retained in the kidneys bilaterally.       Impression:       T12 compression deformity, strictly age-indeterminate with  minimal bony retropulsion not producing significant associated spinal  canal narrowing. Additional vertebral body superior endplate deformity  at L4 likely related to adjacent discogenic degenerative changes. No  definite CT bony findings to suggest underlying metastatic disease,  however CT is insensitive and MRI is recommended if there is further  concern for spinal metastatic disease.     Superimposed lumbar spondylosis with likely moderate to severe spinal  canal narrowing and neuroforaminal stenosis at L4-L5.        This report was finalized on 8/17/2020 12:04 PM by Hi Tomas.       MRI Lumbar Spine Without Contrast [[id number]] Collected:  08/18/20 1840     Updated:  08/19/20 0903    Narrative:       EXAMINATION: MRI LUMBAR SPINE WO CONTRAST-08/18/2020:     INDICATION: Stenosis by CT; R47.1-Dysarthria and anarthria;  R13.12-Dysphagia, oropharyngeal phase; R41.841-Cognitive communication  deficit.      TECHNIQUE: Multiplanar MRI of the lumbar spine without intravenous  contrast.     COMPARISON: CT lumbar spine 08/17/2020.     FINDINGS: Sagittal datasets reveal flattening of the lumbar lordosis  with minimal retrolisthesis of L5 on S1. Compression deformities at the  T12 level with % height loss and 50-75% height loss compression  deformity at the L4 level without associated edema at any level favoring  chronicity without associated retropulsion involvement at either level.  Vertebral body heights otherwise preserved without aggressive bone  marrow signal findings.  Distal spinal cord, conus and cauda equina are  grossly unremarkable for caliber and contour evaluation. Axial dataset  evaluation without paraspinal hematoma, however on T2 axial sequences,  there is abnormal edema and signal within the bilateral  sacral bodies,  right greater than left, of insufficiency fractures with associated  edema likely acute or recent, however, no SI joint widening.     Degenerative changes with level-by-level details for spinal canal and  neuroforaminal patency as below:     L1-L2: Normal.     L2-L3: Normal.     L3-L4: Mild circumferential disc bulge along with mild-to-moderate  ligamentum flavum thickening and facet hypertrophy producing mild spinal  canal stenosis without neuroforaminal stenosis.     L4-L5: Large circumferential disc bulge with right lateralizing features  of predominance along with mild-to-moderate ligamentum flavum thickening  and facet hypertrophy producing mild anterior thecal sac effacement and  spinal canal stenosis with mild-to-moderate right and mild left  neuroforaminal stenoses.     L5-S1: Large circumferential disc bulge along with mild ligamentum  flavum thickening and moderate-to-severe facet hypertrophy, right  greater than left, producing mild-to-moderate spinal canal stenosis with  moderate bilateral neuroforaminal stenoses.       Impression:       1. Abnormal bone marrow signal of associated edema and irregularities of  the cortical margins, bilateral sacral bodies, right greater involvement  than left, of apparent sacral insufficiency fractures without evidence  for SI joint widening. Given bone marrow edema favor acuity or recent  involvement.     2. Chronic compression deformities at the T12 and L4 vertebral levels  without retropulsion associated, detailed further above.     3.  Multilevel spondylitic changes without critical spinal canal  stenosis greatest at the L5-S1 level, detailed on a level-by-level basis  above in the findings.      D:  08/18/2020  E:  08/19/2020                Results for orders placed during the hospital encounter of 08/16/20   Adult Transthoracic Echo Complete W/ Cont if Necessary Per Protocol (With Agitated Saline)    Narrative · Cardiac chamber sizes and wall  thicknesses are normal.  · Global and segmental left ventricular systolic function is normal. The   estimated left ventricular ejection fraction 66% - 70%.  · Aortic valve sclerosis is seen. The valve is functionally normal.  · Atrial valve is structurally and functionally normal.  · There is a small (less than 1 cm) circumferential pericardial effusion.          I have reviewed the medications:  Scheduled Meds:  aspirin 81 mg Oral Daily   Or      aspirin 300 mg Rectal Daily   atorvastatin 80 mg Oral Nightly   citalopram 10 mg Oral Nightly   clopidogrel 75 mg Oral Daily   diclofenac 2 g Topical 4x Daily   docusate sodium 100 mg Oral BID   levothyroxine 100 mcg Oral Q AM   pantoprazole 40 mg Intravenous Q AM   sodium chloride 10 mL Intravenous Q12H     Continuous Infusions:   PRN Meds:.•  acetaminophen **OR** acetaminophen **OR** acetaminophen  •  ipratropium-albuterol  •  metoprolol tartrate  •  ondansetron **OR** ondansetron  •  oxyCODONE-acetaminophen  •  phenol  •  sodium chloride  •  sodium chloride    Assessment/Plan   Assessment & Plan     Active Hospital Problems    Diagnosis  POA   • **Stroke (cerebrum) (CMS/[facility], located within [facility]) [I63.9]  Yes   • Essential hypertension [I10]  Unknown   • GERD without esophagitis [K21.9]  Unknown   • COPD (chronic obstructive pulmonary disease) (CMS/[facility]AC, located within [facility]) [J44.9]  Unknown   • Hypothyroidism (acquired) [E03.9]  Unknown   • Hyperlipemia [E78.5]  Unknown      Resolved Hospital Problems   No resolved problems to display.        Brief Hospital Course to date:  Morelia Madden is a 75 y.o. female with past medical history of hyperlipidemia, hypothyroidism, COPD, GERD, hypertension.  Transferred from OSH after presenting with right facial droop, dysarthria and expressive aphasia, admitted for likely CVA/TIA.     Plan  CVA R hemipons  - Patient with right-sided facial droop, dysarthria and expressive aphasia  - Continue aspirin, Plavix, statin  - SLP, PT/OT to evaluate - very little motivation to work  with therapy      Low back and leg pain  Sacral insufficiency fractures   -Patient with elevated WBC, ESR and CRP  -CT lumbar spine shows L4-5 canal narrowing.  Pt has been bedbound nad has a history of noncompliance (left rehab after a day, did not open door for home health, etc).    - MRI with sacral insuffiency fractures; neurosurgery following and sacral plasty has been offered - family decision currently pending   - on tramadol at home; will only be discharged on home medications      Hypertension-BP currently well controlled, holding home medications      Hyperlipidemia-high-dose statin in place     COPD with ongoing tobacco abuse, not in exacerbation  -Extensive counseled on cessation      Hypothyroidism-continue Synthroid     DVT Prophylaxis: Mechanical     Disposition: TBD.  History of leaving a SNF AMA and being discharged by HH for noncompliance.  Bedbound and not interested in walking. Family plans to take her home on discharge.     CODE STATUS:   Code Status and Medical Interventions:   Ordered at: 08/16/20 2055     Level Of Support Discussed With:    Patient     Code Status:    CPR     Medical Interventions (Level of Support Prior to Arrest):    Full         Electronically signed by Martha Gallegos DO, 08/19/20, 13:24.

## 2020-08-20 VITALS
HEART RATE: 60 BPM | TEMPERATURE: 96.9 F | BODY MASS INDEX: 18.85 KG/M2 | WEIGHT: 96 LBS | DIASTOLIC BLOOD PRESSURE: 105 MMHG | SYSTOLIC BLOOD PRESSURE: 166 MMHG | RESPIRATION RATE: 18 BRPM | HEIGHT: 60 IN | OXYGEN SATURATION: 94 %

## 2020-08-20 PROBLEM — S32.10XA SACRAL FRACTURE (HCC): Status: ACTIVE | Noted: 2020-08-20

## 2020-08-20 LAB
ANION GAP SERPL CALCULATED.3IONS-SCNC: 10 MMOL/L (ref 5–15)
BASOPHILS # BLD AUTO: 0.02 10*3/MM3 (ref 0–0.2)
BASOPHILS NFR BLD AUTO: 0.3 % (ref 0–1.5)
BUN SERPL-MCNC: 18 MG/DL (ref 8–23)
BUN/CREAT SERPL: 35.3 (ref 7–25)
CALCIUM SPEC-SCNC: 9.2 MG/DL (ref 8.6–10.5)
CHLORIDE SERPL-SCNC: 104 MMOL/L (ref 98–107)
CO2 SERPL-SCNC: 26 MMOL/L (ref 22–29)
CREAT SERPL-MCNC: 0.51 MG/DL (ref 0.57–1)
DEPRECATED RDW RBC AUTO: 58.3 FL (ref 37–54)
EOSINOPHIL # BLD AUTO: 0.1 10*3/MM3 (ref 0–0.4)
EOSINOPHIL NFR BLD AUTO: 1.3 % (ref 0.3–6.2)
ERYTHROCYTE [DISTWIDTH] IN BLOOD BY AUTOMATED COUNT: 16.3 % (ref 12.3–15.4)
GFR SERPL CREATININE-BSD FRML MDRD: 117 ML/MIN/1.73
GLUCOSE SERPL-MCNC: 93 MG/DL (ref 65–99)
HCT VFR BLD AUTO: 34.5 % (ref 34–46.6)
HGB BLD-MCNC: 11 G/DL (ref 12–15.9)
IMM GRANULOCYTES # BLD AUTO: 0.02 10*3/MM3 (ref 0–0.05)
IMM GRANULOCYTES NFR BLD AUTO: 0.3 % (ref 0–0.5)
LYMPHOCYTES # BLD AUTO: 1.42 10*3/MM3 (ref 0.7–3.1)
LYMPHOCYTES NFR BLD AUTO: 17.9 % (ref 19.6–45.3)
MCH RBC QN AUTO: 30.9 PG (ref 26.6–33)
MCHC RBC AUTO-ENTMCNC: 31.9 G/DL (ref 31.5–35.7)
MCV RBC AUTO: 96.9 FL (ref 79–97)
MONOCYTES # BLD AUTO: 0.9 10*3/MM3 (ref 0.1–0.9)
MONOCYTES NFR BLD AUTO: 11.4 % (ref 5–12)
NEUTROPHILS NFR BLD AUTO: 5.46 10*3/MM3 (ref 1.7–7)
NEUTROPHILS NFR BLD AUTO: 68.8 % (ref 42.7–76)
NRBC BLD AUTO-RTO: 0 /100 WBC (ref 0–0.2)
PLATELET # BLD AUTO: 462 10*3/MM3 (ref 140–450)
PMV BLD AUTO: 9.1 FL (ref 6–12)
POTASSIUM SERPL-SCNC: 3.3 MMOL/L (ref 3.5–5.2)
RBC # BLD AUTO: 3.56 10*6/MM3 (ref 3.77–5.28)
SODIUM SERPL-SCNC: 140 MMOL/L (ref 136–145)
WBC # BLD AUTO: 7.92 10*3/MM3 (ref 3.4–10.8)

## 2020-08-20 PROCEDURE — 85025 COMPLETE CBC W/AUTO DIFF WBC: CPT | Performed by: INTERNAL MEDICINE

## 2020-08-20 PROCEDURE — 80048 BASIC METABOLIC PNL TOTAL CA: CPT | Performed by: INTERNAL MEDICINE

## 2020-08-20 PROCEDURE — 99239 HOSP IP/OBS DSCHRG MGMT >30: CPT | Performed by: INTERNAL MEDICINE

## 2020-08-20 RX ORDER — ATORVASTATIN CALCIUM 80 MG/1
80 TABLET, FILM COATED ORAL NIGHTLY
Qty: 30 TABLET | Refills: 0 | Status: SHIPPED | OUTPATIENT
Start: 2020-08-20

## 2020-08-20 RX ORDER — ASPIRIN 81 MG/1
81 TABLET, CHEWABLE ORAL DAILY
Qty: 30 TABLET | Refills: 0 | Status: SHIPPED | OUTPATIENT
Start: 2020-08-21

## 2020-08-20 RX ORDER — POTASSIUM CHLORIDE 1.5 G/1.77G
40 POWDER, FOR SOLUTION ORAL ONCE
Status: COMPLETED | OUTPATIENT
Start: 2020-08-20 | End: 2020-08-20

## 2020-08-20 RX ADMIN — PANTOPRAZOLE SODIUM 40 MG: 40 INJECTION, POWDER, FOR SOLUTION INTRAVENOUS at 06:23

## 2020-08-20 RX ADMIN — LEVOTHYROXINE SODIUM 100 MCG: 100 TABLET ORAL at 06:23

## 2020-08-20 RX ADMIN — DOCUSATE SODIUM 100 MG: 50 LIQUID ORAL at 08:40

## 2020-08-20 RX ADMIN — ACETAMINOPHEN 650 MG: 325 TABLET, FILM COATED ORAL at 12:28

## 2020-08-20 RX ADMIN — SODIUM CHLORIDE, PRESERVATIVE FREE 10 ML: 5 INJECTION INTRAVENOUS at 08:41

## 2020-08-20 RX ADMIN — CLOPIDOGREL BISULFATE 75 MG: 75 TABLET ORAL at 08:40

## 2020-08-20 RX ADMIN — OXYCODONE AND ACETAMINOPHEN 1 TABLET: 5; 325 TABLET ORAL at 00:07

## 2020-08-20 RX ADMIN — POTASSIUM CHLORIDE 40 MEQ: 1.5 POWDER, FOR SOLUTION ORAL at 12:28

## 2020-08-20 RX ADMIN — DICLOFENAC 2 G: 10 GEL TOPICAL at 08:41

## 2020-08-20 RX ADMIN — OXYCODONE AND ACETAMINOPHEN 1 TABLET: 5; 325 TABLET ORAL at 08:40

## 2020-08-20 RX ADMIN — DICLOFENAC 2 G: 10 GEL TOPICAL at 12:29

## 2020-08-20 RX ADMIN — ACETAMINOPHEN 650 MG: 325 TABLET, FILM COATED ORAL at 04:39

## 2020-08-20 RX ADMIN — ASPIRIN 81 MG: 81 TABLET, CHEWABLE ORAL at 08:40

## 2020-08-20 NOTE — PROGRESS NOTES
Continued Stay Note  [facility]     Patient Name: Morelia Madden  MRN: [id number]  Today's Date: 8/20/2020    Admit Date: 8/16/2020    Discharge Plan     Row Name 08/20/20 1445       Plan    Plan Comments  Pts POA has agreed to a HH referral. She has said pt has refused to allow HH in her house previoulsy and she suspects will do the same again this time. [facility] has accepted the referral.     Final Discharge Disposition Code  06 - home with home health care        Discharge Codes    No documentation.       Expected Discharge Date and Time     Expected Discharge Date Expected Discharge Time    Aug 20, 2020             Cydney Coates RN

## 2020-08-20 NOTE — PROGRESS NOTES
"                  Clinical Nutrition     Reason for Visit:   Follow-up protocol    Patient Name: Morelia Madden  YOB: 1945  MRN: [id number]  Date of Encounter: 08/20/20 07:25  Admission date: 8/16/2020    Nutrition Assessment   Assessment     Admission diagnosis  Acute CVA, R hemipons    Additional diagnosis/conditions/procedures this admission  Expressive aphasia  Dysarthria  Low back pain/leg pain  Bilateral sacral insufficiency fractures    (8/17) SLP MBS - puree, honey thick liquids    Additional PMH/procedures:  HTN  HLP  COPD  GIB  GERD  Hypothyroid  COPD    Reported/Observed/Food/Nutrition Related History:      Patient with significant expressive aphasia. Mostly nodded head to questions. Tolerating current modified diet. Supplements added to assist with oral intake. Patient nods head yes to questions. Says she has been eating \"some.\" She would like to have some pureed fruit on meal trays.       Anthropometrics     Height: 152.4 cm (60\")  Last filed wt: Weight: 43.5 kg (96 lb) (08/18/20 0600)  Weight Method: Bed scale    BMI: BMI (Calculated): 18.7  Normal: 18.5-24.9kg/m2    Ideal Body Weight (IBW) (kg): 45.86  Admission wt: 82 lb 11.2 oz  Method obtained: bed scale weight per charting 8/16    Labs reviewed     Results from last 7 days   Lab Units 08/20/20  0544 08/16/20  2131   GLUCOSE mg/dL 93 106*   BUN mg/dL 18 18   CREATININE mg/dL 0.51* 0.58   SODIUM mmol/L 140 138   CHLORIDE mmol/L 104 104   POTASSIUM mmol/L 3.3* 3.8   MAGNESIUM mg/dL  --  1.9   ALT (SGPT) U/L  --  7     Results from last 7 days   Lab Units 08/17/20  0449 08/16/20  2131   ALBUMIN g/dL  --  3.80   CRP mg/dL 7.14*  --    CHOLESTEROL mg/dL 145  --    TRIGLYCERIDES mg/dL 100  --        Results from last 7 days   Lab Units 08/17/20  2120 08/17/20  0046 08/16/20  2133   GLUCOSE mg/dL 137* 103 114     Lab Results   Lab Value Date/Time    HGBA1C 5.10 08/17/2020 0449       Medications reviewed   Pertinent: plavix, " protonix      Intake/Output 24 hrs (7:00AM - 6:59 AM)     Intake & Output (last day)       08/19 0701 - 08/20 0700 08/20 0701 - 08/21 0700    I.V. (mL/kg)      Total Intake(mL/kg)      Urine (mL/kg/hr) 0 (0)     Total Output 0     Net 0                 Current Nutrition Prescription     PO: Diet Dysphagia; II - Pureed; Honey Thick; No Straws  Oral Nutrition Supplements: Magic Cup x2/day  Intake: insuff data - 25% x 1 meal    Nutrition Diagnosis     8/18 updated 8/20  Problem Predicted suboptimal energy intake   Etiology Modified diet/dysphagia   Signs/Symptoms RN report of poor appetite on modified diet   Status: ongoing    8/18 updated 8/20  Problem Swallowing difficulty   Etiology Dysphagia   Signs/Symptoms SLP eval 8/17 rec, puree/HTL   Status: ongoing    Nutrition Intervention     1.  Follow treatment progress, Care plan reviewed  2.  Advise alternate selection on modified diet  3. Preferences sent to kitchen  4. Encouraged oral intake    Goal:   General: Nutrition support treatment  PO: Establish PO, Increase intake, Advace diet as medically feasible/appropriate      Monitoring/Evaluation:   Per protocol, PO intake, Supplement intake, Pertinent labs, Symptoms, POC/GOC, Swallow function    Will Continue to follow per protocol    Debra Ball RDN, LD  Time Spent: 30 minutes

## 2020-08-20 NOTE — DISCHARGE SUMMARY
[facility] Medicine Services  DISCHARGE SUMMARY    Patient Name: Morelia Madden  : 1945  MRN: [id number]    Date of Admission: 2020  8:51 PM  Date of Discharge:  20  Primary Care Physician: Samantha Oseguera MD    Consults     Date and Time Order Name Status Description    2020 0833 Inpatient Neurosurgery Consult Completed     2020 2209 Inpatient Neurology Consult Stroke Completed           Hospital Course     Presenting Problem:   CVA (cerebral vascular accident) (CMS/[facility]) [I63.9]  Stroke (cerebrum) (CMS/[facility]) [I63.9]    Active Hospital Problems    Diagnosis  POA   • **Stroke (cerebrum) (CMS/[facility]) [I63.9]  Yes   • Sacral fracture (CMS/[facility]) [S32.10XA]  Unknown   • Essential hypertension [I10]  Yes   • GERD without esophagitis [K21.9]  Yes   • COPD (chronic obstructive pulmonary disease) (CMS/[facility]) [J44.9]  Yes   • Hypothyroidism (acquired) [E03.9]  Yes   • Hyperlipemia [E78.5]  Yes      Resolved Hospital Problems   No resolved problems to display.          Hospital Course:  Morelia Madden is a 75 y.o. female with past medical history of hyperlipidemia, hypothyroidism, COPD, GERD, hypertension.  Transferred from OSH after presenting with right facial droop, dysarthria and expressive aphasia, admitted for likely CVA/TIA.     Plan  CVA R hemipons  - Patient with right-sided facial droop, dysarthria and expressive aphasia  - Continue aspirin, Plavix, statin  - SLP, PT/OT to evaluate - SNF recommended however patient/family declined  - Modified diet - dysphagia II   - Will need neurology follow and repeat CTA in 3 months     Low back and leg pain  Sacral insufficiency fractures   -Patient with elevated WBC, ESR and CRP  -CT lumbar spine shows L4-5 canal narrowing.  Pt has been bedbound and has a history of noncompliance (left rehab after a day, did not open door for home health, etc).    - MRI with sacral insuffiency fractures; neurosurgery evaluated and  sacral plasty has been offered but patient declined   - Patient to continue home pain medications on discharge      Hypertension-BP trended up during admission; will resume home hydralazine on discharge      Hyperlipidemia-high-dose statin in place     COPD with ongoing tobacco abuse, not in exacerbation  -Extensive counseled on cessation      Hypothyroidism-continued Synthroid       Discharge Follow Up Recommendations for outpatient labs/diagnostics:  PCP Dr. Oseguera 1 week  Neurology 6-8 weeks; Repeat CTA in 3 months     Day of Discharge     HPI:   No acute events. Still with expressive aphasia. Nodded that she would like to go home. Called and updated Zoe her daughter in law and they are agreeable for discharge today. Would like home health.     Review of Systems  Gen- No fevers, chills  CV- No chest pain, palpitations  Resp- No cough, dyspnea  GI- No N/V/D, abd pain    Vital Signs:   Temp:  [96.9 °F (36.1 °C)-98.3 °F (36.8 °C)] 96.9 °F (36.1 °C)  Heart Rate:  [] 115  Resp:  [18] 18  BP: (159-189)/() 166/105     Physical Exam:  Constitutional: No acute distress, awake, alert, chronically ill appearing   HENT: NCAT, mucous membranes moist  Respiratory: Clear to auscultation bilaterally, respiratory effort normal   Cardiovascular: RRR, no murmurs, rubs, or gallops, palpable pedal pulses bilaterally  Gastrointestinal: Positive bowel sounds, soft, nontender, nondistended  Musculoskeletal: No bilateral ankle edema  Psychiatric: flat affect, cooperative  Neurologic: Oriented x 3, Cranial Nerves grossly intact to confrontation, dysarthria   Skin: No rashes    Pertinent  and/or Most Recent Results     Results from last 7 days   Lab Units 08/20/20  0544 08/16/20  2131   WBC 10*3/mm3 7.92 13.12*   HEMOGLOBIN g/dL 11.0* 11.8*   HEMATOCRIT % 34.5 36.9   PLATELETS 10*3/mm3 462* 459*   SODIUM mmol/L 140 138   POTASSIUM mmol/L 3.3* 3.8   CHLORIDE mmol/L 104 104   CO2 mmol/L 26.0 21.0*   BUN mg/dL 18 18      CREATININE mg/dL 0.51* 0.58   GLUCOSE mg/dL 93 106*   CALCIUM mg/dL 9.2 9.8     Results from last 7 days   Lab Units 08/16/20 2131 08/16/20 2130   BILIRUBIN mg/dL 0.4  --    ALK PHOS U/L 236*  --    ALT (SGPT) U/L 7  --    AST (SGOT) U/L 14  --    PROTIME Seconds  --  12.8   INR   --  0.99     Results from last 7 days   Lab Units 08/17/20 0449   CHOLESTEROL mg/dL 145   TRIGLYCERIDES mg/dL 100   HDL CHOL mg/dL 46     Results from last 7 days   Lab Units 08/17/20 0449 08/16/20 2131   HEMOGLOBIN A1C % 5.10  --    PROBNP pg/mL  --  2,005.0*   TROPONIN T ng/mL  --  <0.010   PROCALCITONIN ng/mL  --  3.24*       Brief Urine Lab Results  (Last result in the past 365 days)      Color   Clarity   Blood   Leuk Est   Nitrite   Protein   CREAT   Urine HCG        08/17/20 0052 Yellow Clear Negative Negative Negative Negative               Microbiology Results Abnormal     Procedure Component Value - Date/Time    COVID PRE-OP / PRE-PROCEDURE SCREENING ORDER (NO ISOLATION) - Swab, Nasopharynx [[id number]] Collected:  08/17/20 0051    Lab Status:  Final result Specimen:  Swab from Nasopharynx Updated:  08/17/20 0306    Narrative:       The following orders were created for panel order COVID PRE-OP / PRE-PROCEDURE SCREENING ORDER (NO ISOLATION) - Swab, Nasopharynx.  Procedure                               Abnormality         Status                     ---------                               -----------         ------                     Respiratory Panel PCR w/...[[id number]]  Normal              Final result                 Please view results for these tests on the individual orders.    Respiratory Panel PCR w/COVID-19(SARS-CoV-2) OLGA/HERMAN/RICHARD/PAD In-House, NP Swab in [facility], 3-4 HR TAT - Swab, Nasopharynx [[id number]]  (Normal) Collected:  08/17/20 0051    Lab Status:  Final result Specimen:  Swab from Nasopharynx Updated:  08/17/20 0306     ADENOVIRUS, PCR Not Detected     Coronavirus 229E Not Detected     Coronavirus HKU1  Not Detected     Coronavirus NL63 Not Detected     Coronavirus OC43 Not Detected     COVID19 Not Detected     Human Metapneumovirus Not Detected     Human Rhinovirus/Enterovirus Not Detected     Influenza A PCR Not Detected     Influenza A H1 Not Detected     Influenza A H1 2009 PCR Not Detected     Influenza A H3 Not Detected     Influenza B PCR Not Detected     Parainfluenza Virus 1 Not Detected     Parainfluenza Virus 2 Not Detected     Parainfluenza Virus 3 Not Detected     Parainfluenza Virus 4 Not Detected     RSV, PCR Not Detected     Bordetella pertussis pcr Not Detected     Bordetella parapertussis PCR Not Detected     Chlamydophila pneumoniae PCR Not Detected     Mycoplasma pneumo by PCR Not Detected    Narrative:       Fact sheet for providers: https://docs.Match/wp-content/uploads/NOJ0900-5013-FT4.1-EUA-Provider-Fact-Sheet-3.pdf    Fact sheet for patients: https://docs.Match/wp-content/uploads/AGD8758-4210-RA2.1-EUA-Patient-Fact-Sheet-1.pdf          Imaging Results (All)     Procedure Component Value Units Date/Time    CT Lumbar Spine Without Contrast [[id number]] Collected:  08/17/20 1200     Updated:  08/19/20 0910    Addenda:        Addendum: There are subtle areas of cortical irregularity involving  bilateral aspects of the sacrum concerning for areas of sacral  insufficiency fracture. These findings are also noted on subsequently  performed MRI of the lumbar spine.     This report was finalized on 8/19/2020 9:07 AM by Hi Tomas.     Signed:  08/19/20 0907 by Hi Tomas MD    Narrative:       EXAMINATION: CT LUMBAR SPINE WO CONTRAST-      INDICATION: Low back pain, cancer or infection suspected     TECHNIQUE: Axial noncontrast CT of the lumbar spine with multiplanar  reconstructions     The radiation dose reduction device was turned on for each scan per the  ALARA (As Low as Reasonably Achievable) protocol.     COMPARISON: NONE     FINDINGS: There is a T12 vertebral  body compression deformity with  around 75% loss of height and mild bony retropulsion. Likely mild  associated spinal canal narrowing at this level as a result. In the  absence of comparison, acuity is difficult to determine. No paraspinal  hematoma or other acute complications. Diffuse demineralization is noted  elsewhere. There is also central loss of vertebral body height at L4,  likely related to an adjacent Schmorl's node complex. No significant  retropulsion at this level. Superimposed areas of moderate to severe  lumbar spondylosis are noted and likely most severe at L4-L5 where there  appears to be moderate or severe spinal canal and bilateral  neuroforaminal narrowing. Evaluation of the paraspinal soft tissues  demonstrates moderate to severe aortoiliac atherosclerosis. There is  also contrast retained in the kidneys bilaterally.       Impression:       T12 compression deformity, strictly age-indeterminate with  minimal bony retropulsion not producing significant associated spinal  canal narrowing. Additional vertebral body superior endplate deformity  at L4 likely related to adjacent discogenic degenerative changes. No  definite CT bony findings to suggest underlying metastatic disease,  however CT is insensitive and MRI is recommended if there is further  concern for spinal metastatic disease.     Superimposed lumbar spondylosis with likely moderate to severe spinal  canal narrowing and neuroforaminal stenosis at L4-L5.        This report was finalized on 8/17/2020 12:04 PM by Hi Tomas.       MRI Lumbar Spine Without Contrast [[id number]] Collected:  08/18/20 1840     Updated:  08/19/20 0903    Narrative:       EXAMINATION: MRI LUMBAR SPINE WO CONTRAST-08/18/2020:     INDICATION: Stenosis by CT; R47.1-Dysarthria and anarthria;  R13.12-Dysphagia, oropharyngeal phase; R41.841-Cognitive communication  deficit.      TECHNIQUE: Multiplanar MRI of the lumbar spine without intravenous  contrast.        COMPARISON: CT lumbar spine 08/17/2020.     FINDINGS: Sagittal datasets reveal flattening of the lumbar lordosis  with minimal retrolisthesis of L5 on S1. Compression deformities at the  T12 level with % height loss and 50-75% height loss compression  deformity at the L4 level without associated edema at any level favoring  chronicity without associated retropulsion involvement at either level.  Vertebral body heights otherwise preserved without aggressive bone  marrow signal findings.  Distal spinal cord, conus and cauda equina are  grossly unremarkable for caliber and contour evaluation. Axial dataset  evaluation without paraspinal hematoma, however on T2 axial sequences,  there is abnormal edema and signal within the bilateral sacral bodies,  right greater than left, of insufficiency fractures with associated  edema likely acute or recent, however, no SI joint widening.     Degenerative changes with level-by-level details for spinal canal and  neuroforaminal patency as below:     L1-L2: Normal.     L2-L3: Normal.     L3-L4: Mild circumferential disc bulge along with mild-to-moderate  ligamentum flavum thickening and facet hypertrophy producing mild spinal  canal stenosis without neuroforaminal stenosis.     L4-L5: Large circumferential disc bulge with right lateralizing features  of predominance along with mild-to-moderate ligamentum flavum thickening  and facet hypertrophy producing mild anterior thecal sac effacement and  spinal canal stenosis with mild-to-moderate right and mild left  neuroforaminal stenoses.     L5-S1: Large circumferential disc bulge along with mild ligamentum  flavum thickening and moderate-to-severe facet hypertrophy, right  greater than left, producing mild-to-moderate spinal canal stenosis with  moderate bilateral neuroforaminal stenoses.       Impression:       1. Abnormal bone marrow signal of associated edema and irregularities of  the cortical margins, bilateral sacral  bodies, right greater involvement  than left, of apparent sacral insufficiency fractures without evidence  for SI joint widening. Given bone marrow edema favor acuity or recent  involvement.     2. Chronic compression deformities at the T12 and L4 vertebral levels  without retropulsion associated, detailed further above.     3.  Multilevel spondylitic changes without critical spinal canal  stenosis greatest at the L5-S1 level, detailed on a level-by-level basis  above in the findings.      D:  08/18/2020  E:  08/19/2020          FL Video Swallow With Speech Single Contrast [[id number]] Collected:  08/17/20 1441     Updated:  08/17/20 1512    Narrative:       EXAMINATION: FL VIDEO SWALLOW W SPEECH SINGLE-CONTRAST-     INDICATION: dysphagia; R47.1-Dysarthria and anarthria; R13.10-Dysphagia,  unspecified     TECHNIQUE: 1 minute and 54 seconds of fluoroscopic time was used for  this exam. 10 associated fluoroscopic loops were saved. The patient was  evaluated in the seated lateral position while taking a variety of  consistencies of barium by mouth under the direction of speech  pathology.     COMPARISON: NONE     FINDINGS: 1 minute and 54 seconds of fluoroscopy provided for a modified  barium swallow. Please see speech therapy report for full details and  recommendations.          Impression:       Fluoroscopy provided for a modified barium swallow. Please  see speech therapy report for full details and recommendations.         This report was finalized on 8/17/2020 3:09 PM by Hi Tomas.       MRI Brain Without Contrast [[id number]] Collected:  08/17/20 0752     Updated:  08/17/20 0759    Narrative:       EXAMINATION: MRI BRAIN WO CONTRAST-      INDICATION: Stroke     TECHNIQUE: Multiplanar multisequence MRI of the brain without contrast     COMPARISON: CT 1 day prior     FINDINGS: There is a focal area of moderately restricted diffusion  involving the right hemipons concerning for area of acute infarct.  The  significant associated edema, mass effect or evidence hemorrhage. There  are otherwise moderate periventricular white matter changes of chronic  small vessel ischemia. Ventricular size and configuration is normal  accounting for some degree of volume loss. The orbits are normal and the  paranasal sinuses are grossly clear. No additional mass or mass effect.  The craniocervical junction is unremarkable in the midline structures  are normal in appearance.       Impression:       Focal area of restricted diffusion involving the right  hemipons concerning for acute infarct. No associated significant mass  effect or hemorrhage.     Superimposed moderate periventricular white matter changes which are  likely related to chronic small vessel ischemic change.        This report was finalized on 8/17/2020 7:56 AM by Hi Tomas.       CT Angiogram Head [[id number]] Collected:  08/16/20 2209     Updated:  08/16/20 2211    Narrative:       INDICATION:   Ectasia. Last seen well around 2100 last night    TECHNIQUE:   CT angiogram of the head and neck with contrast. 3-D reformatted images were acquired. Evaluation for a significant carotid arterial stenosis is based on the NASCET criteria. Radiation dose reduction techniques included automated exposure control or  exposure modulation based on body size. Radiation audit for number of CT and nuclear cardiology exams performed in the last year:  1. Study performed during the intravenous administration of nonionic contrast media. The doses recorded in the patient's  chart. This also precontrast imaging.    COMPARISON:   CT perfusion study earlier today.    FINDINGS:   CTA neck:  There are vascular calcifications at the aortic arch. Imaging begins above the great vessel origins. There are vascular calcifications at least involving the great vessel origins. I cannot accurately estimate the degree of stenosis. There is  also vascular calcification involving the origin of the  right subclavian artery.    The right carotid system shows mild narrowing at the origin of the right common carotid artery. There is partially calcified plaque at the right carotid bifurcation. By NASCET criteria there is about 50% diameter stenosis of the proximal right internal  carotid artery. There is calcified plaque at the right carotid siphon with likely mild stenosis.    There is likely at least mild stenosis proximal left common carotid artery. There are postoperative changes at the left carotid bifurcation consistent with previous endarterectomy. The distal left common carotid artery is capacious. There is mild  narrowing at the origin of the left external carotid artery. There is an irregular appearance to the proximal left internal carotid artery at the distal postoperative bed with mild, less than 10% diameter stenosis by NASCET criteria. There is calcified  plaque at the left carotid siphon with likely mild stenosis.    There is calcified plaque at the origin of the left vertebral artery and involving the proximal right vertebral artery. Resultant narrowing is mild to moderate bilaterally. The vessels are codominant and both supply the basilar.    CTA head:  There is no intracranial vascular cut off. If there is an anterior communicating artery present it is tiny. There our likely tiny posterior communicating arteries bilaterally. There are tiny, 1 to 2 mm infundibuli at the origin of the  bilateral posterior communicators. Dural venous sinuses are patent. There is mild irregularity of the distal left posterior cerebral artery distribution. This could be a manifestation of intracranial atherosclerotic disease or vasculitis. There is also  mild irregularity of the more distal right MCA vessels again possibly due to intracranial atherosclerotic disease or vasculitis. No focal central stenosis is suspected. Study is performed with rapid technique for stroke evaluation and is therefore  limited for  assessment for intracranial aneurysm.    There are degenerative changes in the cervical spine. The patient is edentulous.      Impression:         1. There is somewhat irregular calcified plaque at the right carotid bifurcation with about 50% diameter stenosis by NASCET criteria.  2. There is been a previous left-sided carotid endarterectomy. There is less than 10% diameter stenosis at the distal end of the endarterectomy.  3. Both vertebral arteries are patent and codominant. Likely mild to moderate stenosis near their origins due to calcified plaque. Both supply the basilar.  4. There is no intracranial vascular cut off or focal central stenosis. There is an irregular appearance to the more distal left posterior cerebral artery distribution and more distal right MCA territory, probably due to intracranial atherosclerotic  disease with vasculitis as the main differential consideration.  5. Likely tiny infundibuli origins of tiny posterior communicating arteries bilaterally.  5. Great vessel origins from the arch are not well included in the field-of-view. Likely some narrowing at the proximal right and left common carotid arteries.    Signer Name: Alexandra Alfaro MD   Signed: 8/16/2020 10:09 PM   Workstation Name: [email or web address]    [facility]    CT Angiogram Neck [[id number]] Collected:  08/16/20 2209     Updated:  08/16/20 2211    Narrative:       INDICATION:   Ectasia. Last seen well around 2100 last night    TECHNIQUE:   CT angiogram of the head and neck with contrast. 3-D reformatted images were acquired. Evaluation for a significant carotid arterial stenosis is based on the NASCET criteria. Radiation dose reduction techniques included automated exposure control or  exposure modulation based on body size. Radiation audit for number of CT and nuclear cardiology exams performed in the last year:  1. Study performed during the intravenous administration of nonionic contrast media. The doses  recorded in the patient's  chart. This also precontrast imaging.    COMPARISON:   CT perfusion study earlier today.    FINDINGS:   CTA neck:  There are vascular calcifications at the aortic arch. Imaging begins above the great vessel origins. There are vascular calcifications at least involving the great vessel origins. I cannot accurately estimate the degree of stenosis. There is  also vascular calcification involving the origin of the right subclavian artery.    The right carotid system shows mild narrowing at the origin of the right common carotid artery. There is partially calcified plaque at the right carotid bifurcation. By NASCET criteria there is about 50% diameter stenosis of the proximal right internal  carotid artery. There is calcified plaque at the right carotid siphon with likely mild stenosis.    There is likely at least mild stenosis proximal left common carotid artery. There are postoperative changes at the left carotid bifurcation consistent with previous endarterectomy. The distal left common carotid artery is capacious. There is mild  narrowing at the origin of the left external carotid artery. There is an irregular appearance to the proximal left internal carotid artery at the distal postoperative bed with mild, less than 10% diameter stenosis by NASCET criteria. There is calcified  plaque at the left carotid siphon with likely mild stenosis.    There is calcified plaque at the origin of the left vertebral artery and involving the proximal right vertebral artery. Resultant narrowing is mild to moderate bilaterally. The vessels are codominant and both supply the basilar.    CTA head:  There is no intracranial vascular cut off. If there is an anterior communicating artery present it is tiny. There our likely tiny posterior communicating arteries bilaterally. There are tiny, 1 to 2 mm infundibuli at the origin of the  bilateral posterior communicators. Dural venous sinuses are patent. There is mild  irregularity of the distal left posterior cerebral artery distribution. This could be a manifestation of intracranial atherosclerotic disease or vasculitis. There is also  mild irregularity of the more distal right MCA vessels again possibly due to intracranial atherosclerotic disease or vasculitis. No focal central stenosis is suspected. Study is performed with rapid technique for stroke evaluation and is therefore  limited for assessment for intracranial aneurysm.    There are degenerative changes in the cervical spine. The patient is edentulous.      Impression:         1. There is somewhat irregular calcified plaque at the right carotid bifurcation with about 50% diameter stenosis by NASCET criteria.  2. There is been a previous left-sided carotid endarterectomy. There is less than 10% diameter stenosis at the distal end of the endarterectomy.  3. Both vertebral arteries are patent and codominant. Likely mild to moderate stenosis near their origins due to calcified plaque. Both supply the basilar.  4. There is no intracranial vascular cut off or focal central stenosis. There is an irregular appearance to the more distal left posterior cerebral artery distribution and more distal right MCA territory, probably due to intracranial atherosclerotic  disease with vasculitis as the main differential consideration.  5. Likely tiny infundibuli origins of tiny posterior communicating arteries bilaterally.  5. Great vessel origins from the arch are not well included in the field-of-view. Likely some narrowing at the proximal right and left common carotid arteries.    Signer Name: Alexandra Alfaro MD   Signed: 8/16/2020 10:09 PM   Workstation Name: [email or web address]    [facility]    XR Chest 1 View [[id number]] Collected:  08/16/20 2202     Updated:  08/16/20 2205    Narrative:       CR Chest 1 Vw    INDICATION:   Stroke protocol chest x-ray.    Evaluate for aspiration pneumonia     COMPARISON:    None  available.    FINDINGS:  Single portable AP view(s) of the chest.        The heart and mediastinal contours are normal. The lungs are clear. No pneumothorax or pleural effusion.       Impression:       No acute cardiopulmonary findings.    Signer Name: Rishi Pride MD   Signed: 8/16/2020 10:02 PM   Workstation Name: [email or web address]    [facility]    CT Cerebral Perfusion With & Without Contrast [[id number]] Collected:  08/16/20 2135     Updated:  08/16/20 2137    Narrative:       CT CEREBRAL PERFUSION W WO CONTRAST    HISTORY:   TIA the facial. Last seen well around 2100 last night.    TECHNIQUE:   Axial CT images of the brain without and with intravenous contrast using cerebral perfusion protocol. Post-processing parametric maps were created using RAPID software and reviewed. Radiation dose reduction techniques included automated exposure control  or exposure modulation based on body size. CT and nuclear cardiology exams in the last 12 months: 0.    COMPARISON:   None    FINDINGS:   Arterial input function is optimal.     No core infarct or ischemic penumbra is documented.      Impression:       Essentially normal CT brain perfusion.          Signer Name: Alexandra Alfaro MD   Signed: 8/16/2020 9:35 PM   Workstation Name: [facility]    [facility]          Results for orders placed during the hospital encounter of 08/16/20   Duplex Carotid Ultrasound CAR    Narrative · Right internal carotid artery stenosis of 50-69%.  · Left internal carotid artery stenosis of 50-69%.  · Vertebral artery flow antegrade bilaterally  · Elevated velocity and monophasic waveform in left subclavian artery   suggesting stenosis          Results for orders placed during the hospital encounter of 08/16/20   Duplex Carotid Ultrasound CAR    Narrative · Right internal carotid artery stenosis of 50-69%.  · Left internal carotid artery stenosis of 50-69%.  · Vertebral artery flow antegrade bilaterally  ·  Elevated velocity and monophasic waveform in left subclavian artery   suggesting stenosis          Results for orders placed during the hospital encounter of 08/16/20   Adult Transthoracic Echo Complete W/ Cont if Necessary Per Protocol (With Agitated Saline)    Narrative · Cardiac chamber sizes and wall thicknesses are normal.  · Global and segmental left ventricular systolic function is normal. The   estimated left ventricular ejection fraction 66% - 70%.  · Aortic valve sclerosis is seen. The valve is functionally normal.  · Atrial valve is structurally and functionally normal.  · There is a small (less than 1 cm) circumferential pericardial effusion.          Plan for Follow-up of Pending Labs/Results: no pending results     Discharge Details        Discharge Medications      New Medications      Instructions Start Date   aspirin 81 MG chewable tablet   81 mg, Oral, Daily   Start Date:  August 21, 2020     atorvastatin 80 MG tablet  Commonly known as:  LIPITOR   80 mg, Oral, Nightly         Continue These Medications      Instructions Start Date   citalopram 10 MG tablet  Commonly known as:  CeleXA   10 mg, Oral, Nightly      clopidogrel 75 MG tablet  Commonly known as:  PLAVIX   75 mg, Oral, Daily      hydrALAZINE 25 MG tablet  Commonly known as:  APRESOLINE   25 mg, Oral, 3 Times Daily      levothyroxine 100 MCG tablet  Commonly known as:  SYNTHROID, LEVOTHROID   100 mcg, Oral, Daily      montelukast 10 MG tablet  Commonly known as:  SINGULAIR   10 mg, Oral, Nightly      pantoprazole 40 MG EC tablet  Commonly known as:  PROTONIX   40 mg, Oral, 2 times daily      promethazine 12.5 MG tablet  Commonly known as:  PHENERGAN   12.5 mg, Oral, Every 6 Hours PRN      vitamin D 1.25 MG (58539 UT) capsule capsule  Commonly known as:  ERGOCALCIFEROL   50,000 Units, Oral, Weekly         Stop These Medications    simvastatin 20 MG tablet  Commonly known as:  ZOCOR            Allergies   Allergen Reactions   • Flomax  [Tamsulosin Hcl] Unknown - Low Severity   • Valsartan Unknown - Low Severity         Discharge Disposition:  Home or Self Care    Diet:  Hospital:  Diet Order   Procedures   • Diet Dysphagia; II - Pureed; Honey Thick; No Straws       Activity:  Activity Instructions     Activity as Tolerated            Restrictions or Other Recommendations:         CODE STATUS:    Code Status and Medical Interventions:   Ordered at: 08/16/20 2055     Level Of Support Discussed With:    Patient     Code Status:    CPR     Medical Interventions (Level of Support Prior to Arrest):    Full       No future appointments.    Additional Instructions for the Follow-ups that You Need to Schedule     Discharge Follow-up with PCP   As directed       Currently Documented PCP:    Samantha Oseguera MD    PCP Phone Number:    [phone]     Follow Up Details:  PCP Dr. Oseguera 1 week         Discharge Follow-up with Specified Provider: Neurology 6 weeks -- needs repeat CTA in 3 months   As directed      To:  Neurology 6 weeks -- needs repeat CTA in 3 months                     Electronically signed by Martha Gallegos DO, 08/20/20, 2:13 PM.      Time Spent on Discharge:  I spent  35  minutes on this discharge activity which included: face-to-face encounter with the patient, reviewing the data in the system, coordination of the care with the nursing staff as well as consultants, documentation, and entering orders.

## 2020-08-21 ENCOUNTER — READMISSION MANAGEMENT (OUTPATIENT)
Dept: CALL CENTER | Facility: HOSPITAL | Age: 75
End: 2020-08-21

## 2020-08-21 NOTE — OUTREACH NOTE
Prep Survey      Responses   [facility] patient discharged from?  [place]   Is LACE score < 7 ?  No   Eligibility  Readm Mgmt   Discharge diagnosis  **Stroke (cerebrum   Does the patient have one of the following disease processes/diagnoses(primary or secondary)?  Stroke (TIA)   Does the patient have Home health ordered?  Yes   What is the Home health agency?   [facility]     Is there a DME ordered?  No   Medication alerts for this patient  ASA    Prep survey completed?  Yes          Kerri Mcbride RN

## 2020-08-25 ENCOUNTER — READMISSION MANAGEMENT (OUTPATIENT)
Dept: CALL CENTER | Facility: HOSPITAL | Age: 75
End: 2020-08-25

## 2020-08-25 NOTE — OUTREACH NOTE
Stroke Week 1 Survey      Responses   [facility] patient discharged from?  [place]   Does the patient have one of the following disease processes/diagnoses(primary or secondary)?  Stroke (TIA)   Is there a successful TCM telephone encounter documented?  No   Week 1 attempt successful?  No   Unsuccessful attempts  Attempt 1          Lainey Lawson RN

## 2020-08-28 ENCOUNTER — READMISSION MANAGEMENT (OUTPATIENT)
Dept: CALL CENTER | Facility: HOSPITAL | Age: 75
End: 2020-08-28

## 2020-08-28 NOTE — OUTREACH NOTE
Stroke Week 1 Survey      Responses   [facility] patient discharged from?  [place]   Does the patient have one of the following disease processes/diagnoses(primary or secondary)?  Stroke (TIA)   Is there a successful TCM telephone encounter documented?  No   Week 1 attempt successful?  Yes   Call start time  1223   Call end time  1235   Discharge diagnosis  **Stroke (cerebrum   Is patient permission given to speak with other caregiver?  Yes   List who call center can speak with  Zoe--DIL   Person spoke with today (if not patient) and relationship  Zoe   Meds reviewed with patient/caregiver?  Yes   Is the patient having any side effects they believe may be caused by any medication additions or changes?  No   Does the patient have all medications ordered at discharge?  Yes   Is the patient taking all medications as directed (includes completed medication regime)?  Yes   Does the patient have a primary care provider?   Yes   Does the patient have an appointment with their PCP within 7 days of discharge?  Yes   Has the patient kept scheduled appointments due by today?  No   What is preventing the patient from keeping their appointments?  Doesn't understand importance   Nursing Interventions  Advised patient to keep appointment, Educated on importance of keeping appointment, Advised to reschedule appointment   Comments  Cancelled appt with PCP   What is the Home health agency?   [facility]     Has home health visited the patient within 72 hours of discharge?  No   Home health comments  Pt cancelled HH therapists--refusing to have anyone come into the home    Psychosocial issues?  No   Does the patient require any assistance with activities of daily living such as eating, bathing, dressing, walking, etc.?  Yes   ADL comments  DIL states pt is not wanting to move much and refusing all help from family   Does the patient have any residual symptoms from stroke/TIA?  Yes   Residual symptoms comments   weakness and difficulty swallowing   Does the patient understand the diet ordered at discharge?  Yes   Comments  Thickened liquids but daughter gives her toast and eggs even after DIL as told her not to   Did the patient receive a copy of their discharge instructions?  Yes   Nursing interventions  Reviewed instructions with patient   What is the patient's perception of their health status since discharge?  Worsening   Nursing interventions  Nurse provided patient education   Is the patient able to teach back FAST for Stroke?  Yes   Is the patient/caregiver able to teach back the risk factors for a stroke?  Smoking, High Cholesterol, Physical inactivity and obesity, Carotid or other artery disease   Is the patient/caregiver able to teach back signs and symptoms related to disease process for when to call PCP?  Yes   Is the patient/caregiver able to teach back signs and symptoms related to disease process for when to call 911?  Yes   If the patient is a current smoker, are they able to teach back resources for cessation?  Smoking cessation medications [Still smoking but dosen't smoke alot]   Is the patient/caregiver able to teach back the hierarchy of who to call/visit for symptoms/problems? PCP, Specialist, Home health nurse, Urgent Care, ED, 911  Yes   Additional teach back comments  DIL states pt noncompliant. Stilll smoking and on oxygen. I have discussed the option of Hospice with DIl and she will speak with pt and pt's son as pt does not seem to be compliant with any therapies. refusing to go to the docotor for any type of f/u appts.    Week 1 call completed?  Yes          Tammi Garrison RN

## 2020-09-04 ENCOUNTER — READMISSION MANAGEMENT (OUTPATIENT)
Dept: CALL CENTER | Facility: HOSPITAL | Age: 75
End: 2020-09-04

## 2020-09-04 NOTE — OUTREACH NOTE
Stroke Week 2 Survey      Responses   [facility] patient discharged from?  [place]   Does the patient have one of the following disease processes/diagnoses(primary or secondary)?  Stroke (TIA)   Week 2 attempt successful?  No   Unsuccessful attempts  Attempt 1          Yasmeen Valentine RN

## 2020-09-08 ENCOUNTER — READMISSION MANAGEMENT (OUTPATIENT)
Dept: CALL CENTER | Facility: HOSPITAL | Age: 75
End: 2020-09-08

## 2020-09-08 NOTE — OUTREACH NOTE
Stroke Week 2 Survey      Responses   [facility] patient discharged from?  [place]   Does the patient have one of the following disease processes/diagnoses(primary or secondary)?  Stroke (TIA)   Week 2 attempt successful?  No   Unsuccessful attempts  Attempt 2          Nicol Woods RN

## 2023-05-31 NOTE — PLAN OF CARE
Problem: Patient Care Overview  Goal: Plan of Care Review  Outcome: Ongoing (interventions implemented as appropriate)  Flowsheets (Taken 8/18/2020 0855 by Eva Lay RN)  Plan of Care Reviewed With: patient  Note:   SLP re-evaluation and treatment completed. Will continue to address dysphagia, speech and cognition in treatment. Please see note for further details and recommendations.        Include Location In Plan?: No Detail Level: Generalized